# Patient Record
Sex: MALE | Race: WHITE | NOT HISPANIC OR LATINO | Employment: STUDENT | URBAN - METROPOLITAN AREA
[De-identification: names, ages, dates, MRNs, and addresses within clinical notes are randomized per-mention and may not be internally consistent; named-entity substitution may affect disease eponyms.]

---

## 2018-02-28 NOTE — MISCELLANEOUS
Message  Return to work or school:   Brittaney Nina is under my professional care  He was seen in my office on 02/15/2016     He is able to return to school on 02/17/2016    PLEASE EXCUSE 02/16  THANK YOU          Signatures   Electronically signed by : Dora Castanon, ; Feb 15 2016  2:02PM EST                       (Author)

## 2018-07-25 ENCOUNTER — OFFICE VISIT (OUTPATIENT)
Dept: PEDIATRICS CLINIC | Age: 18
End: 2018-07-25
Payer: COMMERCIAL

## 2018-07-25 VITALS — TEMPERATURE: 98.3 F | SYSTOLIC BLOOD PRESSURE: 110 MMHG | DIASTOLIC BLOOD PRESSURE: 78 MMHG | WEIGHT: 194 LBS
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
## 2018-07-25 PROCEDURE — 99213 OFFICE O/P EST LOW 20 MIN: CPT | Performed by: PEDIATRICS

## 2018-07-25 RX ORDER — PREDNISONE 20 MG/1
TABLET ORAL
Qty: 13 TABLET | Refills: 0 | Status: SHIPPED | OUTPATIENT
Start: 2018-07-25 | End: 2018-11-13

## 2018-07-25 RX ORDER — MOMETASONE FUROATE 1 MG/G
CREAM TOPICAL
Qty: 45 G | Refills: 0 | Status: SHIPPED | OUTPATIENT
Start: 2018-07-25 | End: 2018-11-13

## 2018-07-25 NOTE — PROGRESS NOTES
Assessment/Plan:    Will start oral steroid and topical antibiotic  If the area becomes tender then it might be infected so he needs to come back  Diagnoses and all orders for this visit:    Insect bite, initial encounter  Comments:  both wrist  Orders:  -     predniSONE 20 mg tablet; 30 mg 2x/day x 3 days then 20 mg 2x/day x 2 days  -     mometasone (ELOCON) 0 1 % cream; Apply to affected area daily        Subjective: insect bites     Patient ID: Sabas Arndt is a 19 y o  male  HPI  Has had this bite in his hands for 1 week it is still itchy and not going away  Has tried, benadryl, aloe and it is not helping /   The following portions of the patient's history were reviewed and updated as appropriate: allergies, current medications, past family history, past medical history, past social history, past surgical history and problem list     Review of Systems   Constitutional: Negative for activity change  HENT: Negative for congestion and sore throat  Respiratory: Negative for cough  Skin:        Skin lesion is the same no new insect bites         Objective:      /78 (BP Location: Left arm, Patient Position: Sitting, Cuff Size: Standard)   Temp 98 3 °F (36 8 °C) (Temporal)   Wt 88 kg (194 lb)          Physical Exam   Constitutional: No distress  HENT:   Nose: Nose normal    Mouth/Throat: Oropharynx is clear and moist  No oropharyngeal exudate  Cardiovascular:   No murmur heard  Pulmonary/Chest: Breath sounds normal    Musculoskeletal: Normal range of motion     Skin:   Some bumps in the right wrist ventral side itchy non tender, some bumps in the left wrist also itchy

## 2018-11-13 ENCOUNTER — OFFICE VISIT (OUTPATIENT)
Dept: PEDIATRICS CLINIC | Age: 18
End: 2018-11-13
Payer: COMMERCIAL

## 2018-11-13 VITALS — TEMPERATURE: 98.4 F | DIASTOLIC BLOOD PRESSURE: 80 MMHG | SYSTOLIC BLOOD PRESSURE: 128 MMHG | WEIGHT: 194 LBS

## 2018-11-13 DIAGNOSIS — R20.2 RIGHT HAND PARESTHESIA: ICD-10-CM

## 2018-11-13 DIAGNOSIS — R20.0 NUMBNESS AND TINGLING IN RIGHT HAND: Primary | ICD-10-CM

## 2018-11-13 DIAGNOSIS — R20.2 NUMBNESS AND TINGLING IN RIGHT HAND: Primary | ICD-10-CM

## 2018-11-13 PROCEDURE — 99213 OFFICE O/P EST LOW 20 MIN: CPT | Performed by: PEDIATRICS

## 2018-11-13 NOTE — PROGRESS NOTES
Assessment/Plan:   XR OF  CERVICAL  SPINE  ORDERED  ADVISED  OBSERVATION  AND  RE  EXAM  IN   2  WEEKS  (OR  SOONER  IF  WORSENS )  CONSIDER  NEUROLOGY  CONSULT      Diagnoses and all orders for this visit:    Numbness and tingling in right hand  -     XR spine cervical complete 4 or 5 vw non injury; Future    Right hand paresthesia          Subjective:     Patient ID: J Luis Finn is a 19 y o  male  FEELS  NUMBNESS  ON HIS   RIGHT  HAND  MOSTLY  AT  LITTLE  FINGER  UP  TO  HIS  NEARBY  PALM  AREA  AND  UP  TO  IS  WRIST , FOR  THE  PAST  4  DAYS   NO  H/O  TRAUMA REPORTED   HAD  FLU  SHOT  2  MONTHS  AGO   RECOVERED  FROM COLD/ COUGH   ILLNESS  2  WEEKS  AGO HAS  NO  LINGERING  COLD  SX , NO  FEVER        Review of Systems   Constitutional: Negative for activity change, appetite change and fever  HENT: Positive for congestion (RESOLVED) and rhinorrhea (RESOLVED)  Respiratory: Positive for cough (RESOLVED)  Neurological: Positive for weakness (REPORTS  SOME  LOSS  OF  STRENGHT  AT  AFFECTED  AREA) and numbness (AT  RIGHT  LTLLE  FINGER  AREA UP  TO  THE   WRIST)  Negative for dizziness and headaches  FEELING  OF  PIN /NEEDLES  AT  AFFECTED  AREA          Objective:     Physical Exam   Constitutional: He appears well-developed and well-nourished  No distress  HENT:   Right Ear: External ear normal    Left Ear: External ear normal    Nose: Nose normal    Mouth/Throat: Oropharynx is clear and moist  No oropharyngeal exudate  Eyes: Conjunctivae and EOM are normal    Neck: Neck supple  No tracheal deviation present  No thyromegaly present  Cardiovascular: Normal rate, regular rhythm and normal heart sounds  No murmur heard  Pulmonary/Chest: Effort normal and breath sounds normal    Abdominal: He exhibits no mass  There is no tenderness  Musculoskeletal: Normal range of motion     NECK  EXAMINATION UNREMARKABLE   AXILLARY  AREA  A ELBOW  WRIST  AND  FINGER  EXAM  UNREMARKABLE  EXCEPT  FOR REPORTED  DISTAL  NUMBNESS  ON  5TH  FINGER    Lymphadenopathy:     He has no cervical adenopathy  Neurological: He is alert  REPORTS  SENSATION CHANGES ON  RIGHT LITTLE   FINGER  UP  TO   LATERALL  WRIST  AREA ,  AREA  OF  NUMBNESS  FOLLOWS  DERMATOME  C-8 NERVE  DISTRIBUTION   ABLE  TO   DISCRIMINATE  SHARP  FROM DULL PRICK  PIN TEST  ON  EXAM  ON  THE  AFFECTED  AREA WITH  EYES  CLOSED , NO GROSS  WEAKNESS  OF   FINGER / ELBOW / ARM  AS  COMPARES  TO  OPPOSITE SIDE    Skin: Skin is warm  No rash (NO RASH) noted  Psychiatric: He has a normal mood and affect  Vitals reviewed

## 2019-03-14 ENCOUNTER — OFFICE VISIT (OUTPATIENT)
Dept: PEDIATRICS CLINIC | Age: 19
End: 2019-03-14
Payer: COMMERCIAL

## 2019-03-14 VITALS
WEIGHT: 195 LBS | SYSTOLIC BLOOD PRESSURE: 120 MMHG | RESPIRATION RATE: 20 BRPM | TEMPERATURE: 98.5 F | DIASTOLIC BLOOD PRESSURE: 80 MMHG

## 2019-03-14 DIAGNOSIS — J03.80 ACUTE BACTERIAL TONSILLITIS: Primary | ICD-10-CM

## 2019-03-14 DIAGNOSIS — L73.1 INGROWN HAIR: ICD-10-CM

## 2019-03-14 DIAGNOSIS — B96.89 ACUTE BACTERIAL TONSILLITIS: Primary | ICD-10-CM

## 2019-03-14 PROCEDURE — 99213 OFFICE O/P EST LOW 20 MIN: CPT | Performed by: PEDIATRICS

## 2019-03-14 RX ORDER — AMOXICILLIN AND CLAVULANATE POTASSIUM 875; 125 MG/1; MG/1
1 TABLET, FILM COATED ORAL 2 TIMES DAILY
Qty: 20 TABLET | Refills: 0 | Status: SHIPPED | OUTPATIENT
Start: 2019-03-14 | End: 2019-03-24

## 2019-03-14 NOTE — PROGRESS NOTES
Assessment/Plan:  I instructed him to use a warm compress on the scrotal lesion  It appears like an ingrown hair  I will treat him with Augmentin for the tonsillitis  Follow up prn  Diagnoses and all orders for this visit:    Acute bacterial tonsillitis  -     amoxicillin-clavulanate (AUGMENTIN) 875-125 mg per tablet; Take 1 tablet by mouth 2 (two) times a day for 10 days    Ingrown hair          Subjective:      Patient ID: Curtis De León is a 19 y o  male  He feels as though is tonsils are swollen x 2 weeks  He has some dysphagia  He has a change in voice  No fever  Some rhinorrhea but not post nasal drip  He is snoring now  No chills, vomiting, or headache  No fatigue or body ache  He is has the same appetite  He is drinking well  He as a lump on the scrotum x 2 weeks  No pain or discharge  He has been trimming the scrotum  No dysuria  No penial discharge  He is sexually active  He has one female partner  He uses condomes sometimes  No testicular pain  No urinary frequency  The following portions of the patient's history were reviewed and updated as appropriate:   He  has no past medical history on file  He There are no active problems to display for this patient  He  has a past surgical history that includes Circumcision  His family history includes No Known Problems in his father and mother  He  reports that he has never smoked  He has never used smokeless tobacco  His alcohol and drug histories are not on file  Current Outpatient Medications   Medication Sig Dispense Refill    amoxicillin-clavulanate (AUGMENTIN) 875-125 mg per tablet Take 1 tablet by mouth 2 (two) times a day for 10 days 20 tablet 0     No current facility-administered medications for this visit  No current outpatient medications on file prior to visit  No current facility-administered medications on file prior to visit  He has No Known Allergies       Review of Systems Constitutional: Negative for fever  HENT: Positive for congestion and trouble swallowing  Negative for rhinorrhea  Snoring   Eyes: Negative for discharge, redness and itching  Respiratory: Negative for cough and shortness of breath  Gastrointestinal: Negative for constipation, diarrhea and vomiting  Genitourinary: Positive for genital sores (on the left side of the undersurface of the scrotum there is a subcutaneous mass  )  Negative for decreased urine volume, difficulty urinating, discharge, dysuria, frequency, hematuria, penile pain, penile swelling, scrotal swelling, testicular pain and urgency  Skin: Negative for rash  Neurological: Negative for headaches  Psychiatric/Behavioral: Negative for sleep disturbance  Objective:      /80   Temp 98 5 °F (36 9 °C)   Resp 20   Wt 88 5 kg (195 lb)          Physical Exam   Constitutional: He appears well-developed and well-nourished  No distress  HENT:   Head: Normocephalic  Right Ear: External ear normal    Left Ear: External ear normal    Nose: No mucosal edema  Mouth/Throat: Mucous membranes are normal  No oropharyngeal exudate, posterior oropharyngeal edema, posterior oropharyngeal erythema or tonsillar abscesses  Tonsils are 3+ on the right  Tonsils are 3+ on the left  No tonsillar exudate  Nasal congestion and erythema of the nasal passages  Eyes: Pupils are equal, round, and reactive to light  Conjunctivae are normal  Right eye exhibits no discharge  Left eye exhibits no discharge  Neck: Normal range of motion  Neck supple  Cardiovascular: Normal rate, regular rhythm and normal heart sounds  No murmur heard  Pulmonary/Chest: Effort normal and breath sounds normal  No respiratory distress  He has no wheezes  He has no rales  Abdominal: Soft  Bowel sounds are normal  He exhibits no distension and no mass  There is no tenderness  There is no guarding     Genitourinary: Penis normal          Lymphadenopathy: He has no cervical adenopathy  Neurological: He is alert  Skin: Skin is warm

## 2019-03-21 ENCOUNTER — OFFICE VISIT (OUTPATIENT)
Dept: PEDIATRICS CLINIC | Age: 19
End: 2019-03-21
Payer: COMMERCIAL

## 2019-03-21 VITALS — SYSTOLIC BLOOD PRESSURE: 116 MMHG | TEMPERATURE: 98.5 F | WEIGHT: 195 LBS | DIASTOLIC BLOOD PRESSURE: 78 MMHG

## 2019-03-21 DIAGNOSIS — B96.89 ACUTE BACTERIAL TONSILLITIS: ICD-10-CM

## 2019-03-21 DIAGNOSIS — J03.80 ACUTE BACTERIAL TONSILLITIS: ICD-10-CM

## 2019-03-21 DIAGNOSIS — Z20.2 EXPOSURE TO GENITAL HERPES: Primary | ICD-10-CM

## 2019-03-21 DIAGNOSIS — L73.1 INGROWN HAIR: ICD-10-CM

## 2019-03-21 PROCEDURE — 99214 OFFICE O/P EST MOD 30 MIN: CPT | Performed by: PEDIATRICS

## 2019-03-21 NOTE — PROGRESS NOTES
Assessment/Plan:  I ordered a HSV PCR of the genital lesion  I did discuss that the lab may not be covered by insurance but he still wanted me to proceed with the test   I also ordered blood HSV 1/2 antibodies  He will follow up prn  The mass seen at the last visit looked much smaller today  He also appears better from the tonsillitis he presented with last visit  Diagnoses and all orders for this visit:    Exposure to genital herpes  -     Herpes I/II IgG JORGE w Reflex to HSV-2; Future  -     Herpes I/II IgG JORGE w Reflex to HSV-2    Acute bacterial tonsillitis    Ingrown hair          Subjective:      Patient ID: Berenice Kumar is a 19 y o  male  Anam Treadwell is here today for a different lesion that he found on the dorsum of the penis  The lesion has been present for about a week  No pain or discharge present  He did pick at the lesion  Anam Treadwell is sexually active with a female partner  She currently has genital herpes  He did have unprotected sex with his girl friend  He has no dysuria or frequency  No fever or chills  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  The following portions of the patient's history were reviewed and updated as appropriate:   He  has no past medical history on file  He   Patient Active Problem List    Diagnosis Date Noted    Ingrown hair 03/21/2019    Scoliosis 08/08/2013     He  has a past surgical history that includes Circumcision  His family history includes No Known Problems in his father and mother  He  reports that he has never smoked  He has never used smokeless tobacco  His alcohol and drug histories are not on file  Current Outpatient Medications   Medication Sig Dispense Refill    amoxicillin-clavulanate (AUGMENTIN) 875-125 mg per tablet Take 1 tablet by mouth 2 (two) times a day for 10 days 20 tablet 0     No current facility-administered medications for this visit        Current Outpatient Medications on File Prior to Visit   Medication Sig  amoxicillin-clavulanate (AUGMENTIN) 875-125 mg per tablet Take 1 tablet by mouth 2 (two) times a day for 10 days     No current facility-administered medications on file prior to visit  He has No Known Allergies       Review of Systems   Constitutional: Negative for fever  HENT: Negative for congestion and rhinorrhea  Eyes: Negative for discharge, redness and itching  Respiratory: Negative for cough and shortness of breath  Gastrointestinal: Negative for constipation, diarrhea and vomiting  Genitourinary: Positive for genital sores  Negative for decreased urine volume, difficulty urinating, discharge, dysuria, enuresis, frequency, penile pain, penile swelling, scrotal swelling, testicular pain and urgency  Skin: Negative for rash  Neurological: Negative for headaches  Psychiatric/Behavioral: Negative for sleep disturbance  Objective:      /78   Temp 98 5 °F (36 9 °C)   Wt 88 5 kg (195 lb)          Physical Exam   Constitutional: He appears well-developed and well-nourished  No distress  HENT:   Head: Normocephalic  Right Ear: External ear normal    Left Ear: External ear normal    Nose: Nose normal    Mouth/Throat: No oropharyngeal exudate  Eyes: Pupils are equal, round, and reactive to light  Conjunctivae are normal  Right eye exhibits no discharge  Left eye exhibits no discharge  Neck: Normal range of motion  Neck supple  Cardiovascular: Normal rate, regular rhythm and normal heart sounds  No murmur heard  Pulmonary/Chest: Effort normal and breath sounds normal  No respiratory distress  He has no wheezes  He has no rales  Abdominal: Soft  Bowel sounds are normal  He exhibits no distension and no mass  There is no tenderness  There is no guarding  Genitourinary: Testes normal  Circumcised  Lymphadenopathy:     He has no cervical adenopathy  No inguinal adenopathy noted on the right or left side  Neurological: He is alert  Skin: Skin is warm

## 2019-03-23 ENCOUNTER — TELEPHONE (OUTPATIENT)
Dept: PEDIATRICS CLINIC | Age: 19
End: 2019-03-23

## 2019-03-23 NOTE — TELEPHONE ENCOUNTER
I called Humberto Kumar to inform him that he tested positive for HSV 1 IGG but was negative for HSV 2  The lesion on the penis was negative for HSV 1 and 2  No other questions were asked  He verbally understood

## 2019-06-26 ENCOUNTER — OFFICE VISIT (OUTPATIENT)
Dept: URGENT CARE | Facility: CLINIC | Age: 19
End: 2019-06-26
Payer: COMMERCIAL

## 2019-06-26 VITALS
SYSTOLIC BLOOD PRESSURE: 132 MMHG | TEMPERATURE: 97.9 F | WEIGHT: 192.6 LBS | HEART RATE: 92 BPM | OXYGEN SATURATION: 97 % | HEIGHT: 72 IN | DIASTOLIC BLOOD PRESSURE: 62 MMHG | RESPIRATION RATE: 16 BRPM | BODY MASS INDEX: 26.09 KG/M2

## 2019-06-26 DIAGNOSIS — L03.311 CELLULITIS OF ABDOMINAL WALL: Primary | ICD-10-CM

## 2019-06-26 PROCEDURE — 99213 OFFICE O/P EST LOW 20 MIN: CPT | Performed by: PHYSICIAN ASSISTANT

## 2019-06-26 RX ORDER — CEPHALEXIN 500 MG/1
500 CAPSULE ORAL EVERY 8 HOURS SCHEDULED
Qty: 21 CAPSULE | Refills: 0 | Status: SHIPPED | OUTPATIENT
Start: 2019-06-26 | End: 2019-07-03

## 2020-01-18 ENCOUNTER — APPOINTMENT (OUTPATIENT)
Dept: RADIOLOGY | Facility: CLINIC | Age: 20
End: 2020-01-18
Payer: COMMERCIAL

## 2020-01-18 ENCOUNTER — OFFICE VISIT (OUTPATIENT)
Dept: URGENT CARE | Facility: CLINIC | Age: 20
End: 2020-01-18
Payer: COMMERCIAL

## 2020-01-18 VITALS
HEIGHT: 72 IN | HEART RATE: 73 BPM | RESPIRATION RATE: 16 BRPM | WEIGHT: 203 LBS | BODY MASS INDEX: 27.5 KG/M2 | TEMPERATURE: 97.5 F | SYSTOLIC BLOOD PRESSURE: 121 MMHG | OXYGEN SATURATION: 99 % | DIASTOLIC BLOOD PRESSURE: 69 MMHG

## 2020-01-18 DIAGNOSIS — M25.512 ACUTE PAIN OF LEFT SHOULDER: ICD-10-CM

## 2020-01-18 DIAGNOSIS — M25.512 ACUTE PAIN OF LEFT SHOULDER: Primary | ICD-10-CM

## 2020-01-18 PROCEDURE — 99213 OFFICE O/P EST LOW 20 MIN: CPT | Performed by: PHYSICIAN ASSISTANT

## 2020-01-18 PROCEDURE — 73030 X-RAY EXAM OF SHOULDER: CPT

## 2020-01-18 NOTE — PROGRESS NOTES
[facility]        NAME: Edmond Mccrary is a 22 y o  male  : 2000    MRN: [id number]  DATE: 2020  TIME: 12:10 PM    Assessment and Plan   Acute pain of left shoulder [M25 512]  1  Acute pain of left shoulder  XR shoulder 2+ vw left    CANCELED: XR shoulder 2+ vw left         Patient Instructions     Discussed condition with patient  X-ray of left shoulder is negative  I suspect an acute strain versus contusion of the left shoulder joint for which I recommended rest, ice, NSAIDs, and observation  He should avoid any heavy lifting or strenuous activity with the left upper extremity  If no significant improvement over the next 1-2 weeks, then I would recommend he be further evaluated  Follow up with PCP in 3-5 days  Proceed to  ER if symptoms worsen  Chief Complaint     Chief Complaint   Patient presents with    Shoulder Injury     Pt reports of shoulder pain from a fall while snowboarding  Incident occurred 5 days ago  History of Present Illness       Patient presents with what he reports are multiple injuries to the left shoulder which have occurred over the past 5 days  He reports that he sustained a few hard falls snowboarding and since then he is having pain in the left shoulder joint worse with movement  He reports some popping but denies any history of dislocation  He denies radiation of the pain down the left upper extremity or any numbness, weakness, paresthesias  He has been treating conservatively since the pain started  Review of Systems   Review of Systems   Constitutional: Negative  Respiratory: Negative  Cardiovascular: Negative  Gastrointestinal: Negative  Genitourinary: Negative  Musculoskeletal:        Left shoulder pain status post fall   Neurological: Negative  Current Medications     No current outpatient medications on file      Current Allergies     Allergies as of 2020    (No Known Allergies)            The following portions of the patient's history were reviewed and updated as appropriate: allergies, current medications, past family history, past medical history, past social history, past surgical history and problem list      History reviewed  No pertinent past medical history  Past Surgical History:   Procedure Laterality Date    CIRCUMCISION         Family History   Problem Relation Age of Onset    No Known Problems Mother     No Known Problems Father          Medications have been verified  Objective   /69   Pulse 73   Temp 97 5 °F (36 4 °C)   Resp 16   Ht 6' (1 829 m)   Wt 92 1 kg (203 lb)   SpO2 99%   BMI 27 53 kg/m²        Physical Exam     Physical Exam   Constitutional: He is oriented to person, place, and time  He appears well-developed and well-nourished  No distress  Musculoskeletal:   Tenderness to palpation over the left shoulder joint worsened with passive range of motion which is intact with some difficulty and there is crepitus noted with movement but no sign of instability noted  There is no surrounding muscle spasm noted  Upper extremity  strength and push/pull 5/5 bilaterally  Neurological: He is alert and oriented to person, place, and time  Psychiatric: He has a normal mood and affect  Vitals reviewed

## 2020-01-18 NOTE — PATIENT INSTRUCTIONS
Shoulder Sprain   WHAT YOU NEED TO KNOW:   A shoulder sprain happens when a ligament in your shoulder is stretched or torn  Ligaments are the tough tissues that connect bones  Ligaments allow you to lift, lower, and rotate your arm  DISCHARGE INSTRUCTIONS:   Return to the emergency department if:   · You are short of breath  · Your throat feels tight, or you have trouble swallowing  · You feel sudden, sharp chest pain on the same side as your injury  · Your skin feels cold or clammy  Contact your healthcare provider if:   · The skin on your injured shoulder looks blue or pale  · You have new or increased swelling and pain in your shoulder  · You have new or increased stiffness when you move your injured shoulder  · You have questions or concerns about your condition or care  Medicines:   · Prescription pain medicine  may be given  Ask how to take this medicine safely  · Take your medicine as directed  Contact your healthcare provider if you think your medicine is not helping or if you have side effects  Tell him or her if you are allergic to any medicine  Keep a list of the medicines, vitamins, and herbs you take  Include the amounts, and when and why you take them  Bring the list or the pill bottles to follow-up visits  Carry your medicine list with you in case of an emergency  Follow up with your healthcare provider as directed:  Write down your questions so you remember to ask them during your visits  Self-care:   · Rest  your shoulder so it can heal  Avoid moving your shoulder as your injury heals  This will help decrease the risk of more damage to your shoulder  · Apply ice  on your shoulder for 15 to 20 minutes every hour or as directed  Use an ice pack, or put crushed ice in a plastic bag  Cover it with a towel  Ice helps prevent tissue damage and decreases swelling and pain  · Compress your shoulder as directed   Compression provides support and helps decrease swelling and movement so your shoulder can heal  For mild sprains, you may be given a sling to support your arm  You may need a padded brace or a plaster cast to hold your shoulder in place if the sprain is more serious  How to wear a brace, sling, or splint:  A brace, sling, or splint may be needed to limit your movement and protect your injured shoulder  · Wear your brace, sling, or splint as directed  You may need to wear it all the time and take it off only to bathe or do exercises  Ask your healthcare provider how long you should wear it  · Keep your skin clean and dry  Padding under your armpit will help absorb sweat and prevent sores on your skin  · Do not hunch your shoulders  This may cause pain  Keep your shoulders relaxed  · Position the sling over your arm and hand so that it also covers your knuckles  This will help the sling support your wrist and hand  Position your wrist higher than your elbow  Your wrist may start to hurt or go numb if your sling is too short  Exercise your shoulder:  After you rest your shoulder for 3 to 7 days, you will need to do light exercises to decrease shoulder stiffness  Check with your healthcare provider before you return to your normal activities or sports  Prevent another injury:  You can hurt your shoulder again if you stop treatment too soon  The following may decrease your risk for sprains:  · Do not exercise when you are tired or in pain  Warm up and stretch before you exercise  · Wear equipment to protect yourself when you play sports  · Wear shoes that fit well and run on flat surfaces to prevent falls  © 2017 [employer] Information is for End User's use only and may not be sold, redistributed or otherwise used for commercial purposes  All illustrations and images included in CareNotes® are the copyrighted property of [employer]  or [employer]  The above information is an  only   It is not intended as medical advice for individual conditions or treatments  Talk to your doctor, nurse or pharmacist before following any medical regimen to see if it is safe and effective for you

## 2020-02-24 VITALS
HEIGHT: 72 IN | DIASTOLIC BLOOD PRESSURE: 77 MMHG | HEART RATE: 65 BPM | SYSTOLIC BLOOD PRESSURE: 144 MMHG | BODY MASS INDEX: 27.85 KG/M2 | WEIGHT: 205.6 LBS

## 2020-02-24 DIAGNOSIS — S46.812A STRAIN OF LEFT TRAPEZIUS MUSCLE, INITIAL ENCOUNTER: Primary | ICD-10-CM

## 2020-02-24 PROCEDURE — 3008F BODY MASS INDEX DOCD: CPT | Performed by: ORTHOPAEDIC SURGERY

## 2020-02-24 PROCEDURE — 1036F TOBACCO NON-USER: CPT | Performed by: ORTHOPAEDIC SURGERY

## 2020-02-24 PROCEDURE — 99203 OFFICE O/P NEW LOW 30 MIN: CPT | Performed by: ORTHOPAEDIC SURGERY

## 2020-02-24 NOTE — PATIENT INSTRUCTIONS
Early Postoperative or Post Injury Shoulder Exercises   WHAT YOU NEED TO KNOW:   You may need to wait until your swelling and pain have gone down before you start to exercise  Do not start an exercise program before you talk to your healthcare provider  DISCHARGE INSTRUCTIONS:   Contact your healthcare provider if:   · You have sharp or worsening pain during exercise or at rest     · You have questions or concerns about your shoulder exercises  Before you exercise:  Warm up and stretch before you exercise  Walk or ride a stationary bike for 5 to 10 minutes to help you warm up  Stretching helps increase range of motion  It may also decrease muscle soreness and help prevent another injury  Your healthcare provider will tell you which of the following stretches to do:  · Crossover arm stretch:  Relax your shoulders  Hold your upper arm with the opposite hand  Pull your arm across your chest until you feel a stretch  Hold the stretch for 30 seconds  Return to the starting position  · Shoulder flexion stretch:  Stand facing a wall  Slowly walk your fingers up the wall until you feel a stretch  Hold the stretch for 30 seconds  Return to the starting position  · Sleeper stretch:  Lie on your injured side on a firm, flat surface  Bend the elbow of your injured arm 90° with your hand facing up  Use your arm that is not injured to slowly push your injured arm down  Stop when you feel a stretch at the back of your injured shoulder  Hold the stretch for 30 seconds  Slowly return to the starting position  How to perform exercises without a weight or an exercise band:   · Pendulum swings:  Lean forward and rest the arm that is not injured on a table  Do not round your back or lock your knees during the exercise  Let your other arm hang freely by your side  Gently swing your injured arm forward and backward, side to side, and in circles  · Shrugs:  Stand with your arms by your side   Gently lift your shoulders up to your ears and hold for 5 seconds  With your shoulders lifted, pinch your shoulder blades together  Hold for 5 seconds  Slowly return to the starting position  How to exercise with a weight:  Hold a weight with your arm slightly in front of your body  Slowly raise your arm to the side with your thumb pointing up or down as directed  Stop when you reach the level of your shoulder  Hold for as many seconds as directed  Slowly return to the starting position  How to exercise with an exercise band:   · Hold the exercise band with both hands in front of your body  Slowly raise your injured arm up and to the side with your thumb pointing up or down as directed  Stop when you reach the level of your shoulder  Hold for as many seconds as directed  Slowly return to the starting position  · Tie one end of the exercise band to a heavy object  Stand and hold the band in your hand  Bend your elbow  Keep your arm close to your side and pull the band straight back  Squeeze your shoulder blades together as you pull  Slowly return to the starting position  Follow up with your physical therapist as directed:  Write down your questions so you remember to ask them at your visits  © 2017 [facility] Information is for End User's use only and may not be sold, redistributed or otherwise used for commercial purposes  All illustrations and images included in CareNotes® are the copyrighted property of [employer]  or [employer]  The above information is an  only  It is not intended as medical advice for individual conditions or treatments  Talk to your doctor, nurse or pharmacist before following any medical regimen to see if it is safe and effective for you

## 2020-02-24 NOTE — PROGRESS NOTES
Assessment/Plan:  1  Strain of left trapezius muscle, initial encounter  Ambulatory referral to Physical Therapy       Scribe Attestation    I,:   Jocelyn Rogers am acting as a scribe while in the presence of the attending physician :        I,:   Alberto Kaiser MD personally performed the services described in this documentation    as scribed in my presence :              Nicolle Adhikari has excellent range of motion and strength about the shoulder  The only limitation I could find is a slight limitation in left lateral flexion of the cervical spine that did produce a tightness sensation  There is increased tissue tension in the left trapezius region as well  I cannot necessarily say there is  tenderness but more so a mild restriction  The rotator cuff has excellent strength and he is negative for labral injury or glenohumeral instability   The acromioclavicular joint is nontender and normal in appearance  The sternoclavicular joint also is nontender and normal in appearance  I feel he will do well with a physician directed exercise program at home  I did explain that this may take some time to return to normalcy  My  did show him additional cervical spine as well as left trapezius flexibility exercises  If he is not feeling well in the next month he should return to see me  Subjective:   Allyson Aguirre is a 22 y o  male who presents to the office today for evaluation of his left shoulder  On January 14th he was snowboarding and fell repeatedly landing on the left shoulder  He he did not experience pain at the time but later that evening he does drives a persistent ache located in the left trapezius region that was nonradiating  At the time the pain is considered mild to moderate  He states that has improved over time  He still complains of a mild soreness with exercise such as pull-ups  Turning a steering wheel will also exacerbate his symptoms    Reaching across his body to scratch is back also will produce mild discomfort  Currently has no pain at rest and denies distal paresthesias  Review of Systems   Constitutional: Negative for chills, fever and unexpected weight change  HENT: Negative for hearing loss, nosebleeds and sore throat  Eyes: Negative for pain, redness and visual disturbance  Respiratory: Negative for cough, shortness of breath and wheezing  Cardiovascular: Negative for chest pain, palpitations and leg swelling  Gastrointestinal: Negative for abdominal pain, nausea and vomiting  Endocrine: Negative for polyphagia and polyuria  Genitourinary: Negative for dysuria and hematuria  Musculoskeletal:        See HPI   Skin: Negative for rash and wound  Neurological: Negative for dizziness, numbness and headaches  Psychiatric/Behavioral: Negative for decreased concentration and suicidal ideas  The patient is not nervous/anxious  History reviewed  No pertinent past medical history  Past Surgical History:   Procedure Laterality Date    CIRCUMCISION         Family History   Problem Relation Age of Onset    No Known Problems Mother     No Known Problems Father     No Known Problems Sister     No Known Problems Brother     No Known Problems Maternal Aunt     No Known Problems Maternal Uncle     No Known Problems Paternal Aunt     No Known Problems Paternal Uncle     No Known Problems Maternal Grandmother     No Known Problems Maternal Grandfather     No Known Problems Paternal Grandmother     No Known Problems Paternal Grandfather        Social History     Occupational History    Not on file   Tobacco Use    Smoking status: Never Smoker    Smokeless tobacco: Never Used   Substance and Sexual Activity    Alcohol use: Yes     Frequency: Monthly or less    Drug use: Never    Sexual activity: Yes     Partners: Female     Birth control/protection: Condom Male       No current outpatient medications on file      No Known Allergies    Objective:  Vitals:    02/24/20 0821   BP: 144/77   Pulse: 65       Left Shoulder Exam     Tenderness   The patient is experiencing no tenderness  Range of Motion   Active abduction: 170   External rotation: 90 (90° OF ABDUCTION)   Forward flexion: 180   Internal rotation 0 degrees: Mid thoracic   Internal rotation 90 degrees: 80     Muscle Strength   Abduction: 5/5   Internal rotation: 5/5   External rotation: 5/5   Supraspinatus: 5/5   Subscapularis: 5/5   Biceps: 5/5     Tests   Apprehension: negative  Saldaña test: negative  Cross arm: negative  Impingement: negative  Sulcus: absent    Other   Sensation: normal  Pulse: present (2+ RADIAL PULSE)     Comments:  PALPABLE TISSUE TENSION IN TRAPEZIUS 1 AND 2 THAT IS NON-TENDER    NORMAL CERVICAL ROTATION AND FLEXION AND EXTENSION  RIGHT LATERAL FLEXION IS NORMAL  LEFT LATERAL FLEXION IS SLIGHTLY LIMITED IN COMPARISON TO THE RIGHT  Physical Exam   Constitutional: He is oriented to person, place, and time  He appears well-developed and well-nourished  HENT:   Head: Normocephalic and atraumatic  Eyes: Conjunctivae are normal  Right eye exhibits no discharge  Left eye exhibits no discharge  Neck: Normal range of motion  Neck supple  Cardiovascular: Regular rhythm and intact distal pulses  Pulmonary/Chest: Effort normal  No respiratory distress  Neurological: He is alert and oriented to person, place, and time  Skin: Skin is warm and dry  Psychiatric: He has a normal mood and affect  His behavior is normal    Vitals reviewed  I have personally reviewed pertinent films in PACS and my interpretation is as follows:  X-RAYS OF THE LEFT SHOULDER NORMAL  THERE IS NO EVIDENCE OF ACUTE FRACTURE OR OTHER OSSEOUS ABNORMALITY

## 2020-10-04 ENCOUNTER — OFFICE VISIT (OUTPATIENT)
Dept: URGENT CARE | Facility: CLINIC | Age: 20
End: 2020-10-04
Payer: COMMERCIAL

## 2020-10-04 VITALS
HEART RATE: 62 BPM | OXYGEN SATURATION: 100 % | SYSTOLIC BLOOD PRESSURE: 120 MMHG | WEIGHT: 211 LBS | HEIGHT: 72 IN | DIASTOLIC BLOOD PRESSURE: 70 MMHG | BODY MASS INDEX: 28.58 KG/M2 | RESPIRATION RATE: 18 BRPM | TEMPERATURE: 97 F

## 2020-10-04 DIAGNOSIS — J35.8 TONSIL STONE: ICD-10-CM

## 2020-10-04 DIAGNOSIS — J35.1 TONSILLAR ENLARGEMENT: Primary | ICD-10-CM

## 2020-10-04 PROBLEM — R59.9 SWOLLEN GLAND: Status: ACTIVE | Noted: 2020-10-04

## 2020-10-04 LAB — S PYO AG THROAT QL: NEGATIVE

## 2020-10-04 PROCEDURE — 99213 OFFICE O/P EST LOW 20 MIN: CPT | Performed by: FAMILY MEDICINE

## 2020-10-04 PROCEDURE — 87880 STREP A ASSAY W/OPTIC: CPT | Performed by: FAMILY MEDICINE

## 2020-10-04 RX ORDER — CETIRIZINE HYDROCHLORIDE 10 MG/1
10 TABLET ORAL DAILY PRN
COMMUNITY

## 2020-10-04 RX ORDER — METHYLPREDNISOLONE 4 MG/1
TABLET ORAL
Qty: 21 TABLET | Refills: 0 | Status: SHIPPED | OUTPATIENT
Start: 2020-10-04 | End: 2020-10-21 | Stop reason: ALTCHOICE

## 2020-10-04 RX ORDER — PENICILLIN V POTASSIUM 500 MG/1
500 TABLET ORAL EVERY 12 HOURS
Qty: 20 TABLET | Refills: 0 | Status: SHIPPED | OUTPATIENT
Start: 2020-10-04 | End: 2020-10-14

## 2020-10-21 ENCOUNTER — OFFICE VISIT (OUTPATIENT)
Dept: FAMILY MEDICINE CLINIC | Facility: CLINIC | Age: 20
End: 2020-10-21
Payer: COMMERCIAL

## 2020-10-21 VITALS
WEIGHT: 207 LBS | BODY MASS INDEX: 28.04 KG/M2 | DIASTOLIC BLOOD PRESSURE: 62 MMHG | HEART RATE: 56 BPM | RESPIRATION RATE: 16 BRPM | SYSTOLIC BLOOD PRESSURE: 110 MMHG | HEIGHT: 72 IN | TEMPERATURE: 95.7 F

## 2020-10-21 DIAGNOSIS — Z00.00 WELL ADULT EXAM: Primary | ICD-10-CM

## 2020-10-21 DIAGNOSIS — Z11.1 TUBERCULOSIS SCREENING: ICD-10-CM

## 2020-10-21 DIAGNOSIS — Z23 NEED FOR VACCINATION: ICD-10-CM

## 2020-10-21 PROCEDURE — 99385 PREV VISIT NEW AGE 18-39: CPT | Performed by: FAMILY MEDICINE

## 2020-10-21 PROCEDURE — 3725F SCREEN DEPRESSION PERFORMED: CPT | Performed by: FAMILY MEDICINE

## 2020-10-21 PROCEDURE — 90471 IMMUNIZATION ADMIN: CPT

## 2020-10-21 PROCEDURE — 1036F TOBACCO NON-USER: CPT | Performed by: FAMILY MEDICINE

## 2020-10-21 PROCEDURE — 90715 TDAP VACCINE 7 YRS/> IM: CPT

## 2020-10-21 PROCEDURE — 86580 TB INTRADERMAL TEST: CPT

## 2020-10-21 PROCEDURE — 90472 IMMUNIZATION ADMIN EACH ADD: CPT

## 2020-10-21 PROCEDURE — 90686 IIV4 VACC NO PRSV 0.5 ML IM: CPT

## 2020-10-23 ENCOUNTER — CLINICAL SUPPORT (OUTPATIENT)
Dept: FAMILY MEDICINE CLINIC | Facility: CLINIC | Age: 20
End: 2020-10-23

## 2020-10-23 VITALS — TEMPERATURE: 96.9 F

## 2020-10-23 DIAGNOSIS — Z11.1 ENCOUNTER FOR PPD SKIN TEST READING: Primary | ICD-10-CM

## 2020-10-23 LAB
INDURATION: 0 MM
TB SKIN TEST: NEGATIVE

## 2021-09-24 ENCOUNTER — APPOINTMENT (OUTPATIENT)
Dept: RADIOLOGY | Facility: CLINIC | Age: 21
End: 2021-09-24
Payer: COMMERCIAL

## 2021-09-24 ENCOUNTER — OFFICE VISIT (OUTPATIENT)
Dept: OBGYN CLINIC | Facility: CLINIC | Age: 21
End: 2021-09-24
Payer: COMMERCIAL

## 2021-09-24 DIAGNOSIS — S46.811A STRAIN OF RIGHT TRAPEZIUS MUSCLE, INITIAL ENCOUNTER: Primary | ICD-10-CM

## 2021-09-24 DIAGNOSIS — M25.511 RIGHT SHOULDER PAIN, UNSPECIFIED CHRONICITY: ICD-10-CM

## 2021-09-24 PROCEDURE — 99214 OFFICE O/P EST MOD 30 MIN: CPT | Performed by: ORTHOPAEDIC SURGERY

## 2021-09-24 PROCEDURE — 73030 X-RAY EXAM OF SHOULDER: CPT

## 2021-09-24 NOTE — PATIENT INSTRUCTIONS
Exercises for Shoulder Abduction and Adduction   AMBULATORY CARE:   Shoulder abduction and adduction exercises  work the muscles at the back of your shoulder and your upper back  Before you exercise:  Warm up and stretch before you exercise  Walk or ride a stationary bike for 5 to 10 minutes to help you warm up  Stretching helps increase range of motion  It may also decrease muscle soreness and help prevent another injury  Your healthcare provider will tell you which of the following stretches to do:  · Crossover arm stretch:  Relax your shoulders  Hold your upper arm with the opposite hand  Pull your arm across your chest until you feel a stretch  Hold the stretch for 30 seconds  Return to the starting position  · Shoulder flexion stretch:  Stand facing a wall  Slowly walk your fingers up the wall until you feel a stretch  Hold the stretch for 30 seconds  Return to the starting position  · Sleeper stretch:  Lie on your injured side on a firm, flat surface  Bend the elbow of your injured arm 90° with your hand facing up  Use your arm that is not injured to slowly push your injured arm down  Stop when you feel a stretch at the back of your injured shoulder  Hold the stretch for 30 seconds  Slowly return to the starting position  Contact your healthcare provider if:   · You have sharp or worsening pain during exercise or at rest     · You have questions or concerns about your shoulder exercises  How to exercise with a weight:  Your healthcare provider will tell you how much weight to use  · Shoulder abduction:  Stand and hold a weight in your hand with your palm facing your body  Slowly raise your arm to the side with your thumb pointing up  Then raise your arm over your head as far as you can without pain  Hold this position for as long as directed  Do not raise your arm over your head unless your healthcare provider says it is okay            · Shoulder adduction:  Lie on your back on a firm surface  Extend your arm out to a "T " Bend your elbow so your forearm in the air  Hold a weight in your hand  Slowly raise your arm toward the ceiling and straighten your elbow  Hold this position for as long as directed  Slowly return to the starting position  How to exercise with an exercise band:   · Shoulder abduction:  Wrap the exercise band around a heavy, stable object near your foot  Grab the band with the hand of your injured shoulder  Keep your arm straight  Slowly raise your arm to the side with your thumb pointing up  Then, slowly pull the band over your head as far as you can without pain  Do not raise your arm over your head unless your healthcare provider says it is okay  Do not let your shoulder shrug  Hold this position for as long as directed  Slowly return to the starting position  · Shoulder adduction:  Wrap the exercise band around a heavy, stable object  Stand and face away from where the band is anchored  Hold each end of the band in both hands with your elbows bent  Your elbows should not be behind your body  Keep your arms parallel to the floor and slowly straighten your elbows  Hold this position for as long as directed  Slowly return to the starting position  Follow up with your physical therapist as directed:  Write down your questions so you remember to ask them at your visits  © Copyright [employer] 2021 Information is for End User's use only and may not be sold, redistributed or otherwise used for commercial purposes  All illustrations and images included in CareNotes® are the copyrighted property of [employer]  or Dashawn Ashraf   The above information is an  only  It is not intended as medical advice for individual conditions or treatments  Talk to your doctor, nurse or pharmacist before following any medical regimen to see if it is safe and effective for you    Exercises for Internal and External Shoulder Rotation   AMBULATORY CARE:   Muscles worked during internal and external shoulder exercises:  Exercises for internal shoulder rotation work the muscles in your chest and front of your shoulder  Exercises for external shoulder rotation work the muscles in the back of your shoulder and upper back  Contact your healthcare provider if:   · You have sharp or worsening pain during exercise or at rest     · You have questions or concerns about your shoulder exercises  Before you exercise:  Warm up and stretch before you exercise  Walk or ride a stationary bike for 5 to 10 minutes to help you warm up  Stretching helps increase range of motion  It may also decrease muscle soreness and help prevent another injury  Your healthcare provider will tell you which of the following stretches to do:  · Crossover arm stretch:  Relax your shoulders  Hold your upper arm with the opposite hand  Pull your arm across your chest until you feel a stretch  Hold the stretch for 30 seconds  Return to the starting position  · Shoulder flexion stretch:  Stand facing a wall  Slowly walk your fingers up the wall until you feel a stretch  Hold the stretch for 30 seconds  Return to the starting position  · Sleeper stretch:  Lie on your injured side on a firm, flat surface  Bend the elbow of your injured arm 90° with your hand facing up  Use your arm that is not injured to slowly push your injured arm down  Stop when you feel a stretch at the back of your injured shoulder  Hold the stretch for 30 seconds  Slowly return to the starting position  How to exercise with a weight:  Your healthcare provider will tell you how much weight to exercise with  · Shoulder internal rotation:  Sit in a chair  Place a rolled up towel between your elbow and your side  Bend your elbow to 90°  Gently squeeze the towel with your elbow to prevent it from falling out  Hold the weight with your thumb pointing up   Slowly move the weight across your chest  Stop when your hand reaches your opposite arm  Hold this position for as many seconds as directed  Slowly return to the starting position  · Shoulder external rotation:  Lie on your side with your injured shoulder facing up  Bend your elbow 90°  Place a rolled up towel between your elbow and your side  Hold a weight in your hand  Gently squeeze the towel with your elbow to prevent it from falling out  Slowly rotate your arm outward, but keep your elbow bent  Stop when you feel a stretch  Hold this position for 30 seconds or as directed  Slowly return to the starting position  How to exercise with an exercise band:   · Shoulder internal rotation:  Tie one end of the exercise band to a heavy, secure object  Sit in a chair  Place a rolled up towel between your elbow and your side  Bend your elbow to 90°  Gently squeeze the towel with your elbow to prevent it from falling out  Slowly pull the band across your chest  Stop when your hand reaches your opposite arm  Hold this position for as many seconds as directed  Slowly return to the starting position  · Shoulder external rotation:  Hold one end of the exercise band on the side that is not injured  Place a rolled up towel between your elbow and your side  Bend your elbow 90°  Squeeze the towel with your elbow  Grab the end of the band and slowly turn your arm outward, but keep your elbow bent  Stop when you feel a stretch  Hold this position for 30 seconds or as directed  Slowly return to the starting position  Follow up with your physical therapist as directed:  Write down your questions so you remember to ask them at your visits  © Copyright [employer] 2021 Information is for End User's use only and may not be sold, redistributed or otherwise used for commercial purposes  All illustrations and images included in CareNotes® are the copyrighted property of [employer]  or Dashawn Ashraf   The above information is an  only   It is not intended as medical advice for individual conditions or treatments  Talk to your doctor, nurse or pharmacist before following any medical regimen to see if it is safe and effective for you

## 2021-09-24 NOTE — PROGRESS NOTES
Assessment/Plan:  1  Strain of right trapezius muscle, initial encounter     2  Right shoulder pain, unspecified chronicity  XR shoulder 2+ vw right       Scribe Attestation    I,:  Martina Lewis MA am acting as a scribe while in the presence of the attending physician :       I,:  Gill Suarez MD personally performed the services described in this documentation    as scribed in my presence :             I discussed with Eduardo Rowan that his signs and symptoms are consistent with a trapezius strain  I discussed with him that it is common for a shoulder injury to effect the entire shoulder  He has good range of motion and strength on exam  Treatment options were discussed in the form of a physician directed HEP for strengthening  He was provided with this and a theraband in the office today  Patient was advised to use heat/ice 20 minutes on 20 minutes off  We did discuss low weight and and high repetition exercises at the gym  He may follow up with me as needed  Subjective:   Theresa Alvarez is a 19 y o  male who presents to the office today for evaluation of right shoulder pain  Patient states appx 2 weeks ago he was in the gym doing a sitting dumb be;l curl when he picked up the weight and felt a immediate sharp shooting pain to the anterior aspect of his shoulder  He denies a pop or snap  He denies any bruising  Patient states since the injury, the pain has somewhat improved  He notes pain to the superior and posterior aspect  He notes increased pain and stiffness in the morning  He has been avoiding any heavy lifting  He has not been to the gym  He has not taken anything OTC for pain  He has been doing some home stretches  He denies prior surgery on the shoulder  He denies any numbness or tingling  Review of Systems   Constitutional: Negative for chills and fever  HENT: Negative for drooling and sneezing  Eyes: Negative for redness  Respiratory: Negative for cough and wheezing  Gastrointestinal: Negative for nausea and vomiting  Musculoskeletal: Negative for arthralgias, joint swelling and myalgias  Neurological: Negative for weakness and numbness  Psychiatric/Behavioral: Negative for behavioral problems  The patient is not nervous/anxious  Past Medical History:   Diagnosis Date    Allergic rhinitis        Past Surgical History:   Procedure Laterality Date    CIRCUMCISION         Family History   Problem Relation Age of Onset    No Known Problems Mother     No Known Problems Father     No Known Problems Sister     No Known Problems Brother     No Known Problems Maternal Aunt     No Known Problems Maternal Uncle     No Known Problems Paternal Aunt     No Known Problems Paternal Uncle     No Known Problems Maternal Grandmother     No Known Problems Maternal Grandfather     No Known Problems Paternal Grandmother     No Known Problems Paternal Grandfather        Social History     Occupational History    Not on file   Tobacco Use    Smoking status: Never Smoker    Smokeless tobacco: Never Used   Vaping Use    Vaping Use: Never used   Substance and Sexual Activity    Alcohol use: Yes     Comment: social    Drug use: Never    Sexual activity: Yes     Partners: Female     Birth control/protection: Condom Male         Current Outpatient Medications:     cetirizine (ZyrTEC) 10 mg tablet, Take 10 mg by mouth daily as needed for allergies, Disp: , Rfl:     No Known Allergies    Objective: There were no vitals filed for this visit  Right Shoulder Exam     Range of Motion   External rotation: 90   Internal rotation 90 degrees: 60     Muscle Strength   Abduction: 5/5   Internal rotation: 5/5   External rotation: 5/5     Tests   Saldaña test: negative    Other   Erythema: absent  Sensation: normal  Pulse: present    Comments:  - speed's  Pectoralis is palpable and intact             Physical Exam  Constitutional:       Appearance: He is well-developed     HENT: Head: Normocephalic and atraumatic  Eyes:      General:         Right eye: No discharge  Left eye: No discharge  Conjunctiva/sclera: Conjunctivae normal    Cardiovascular:      Rate and Rhythm: Normal rate  Pulmonary:      Effort: Pulmonary effort is normal  No respiratory distress  Musculoskeletal:      Cervical back: Normal range of motion and neck supple  Comments: As noted in HPI   Skin:     General: Skin is warm and dry  Neurological:      Mental Status: He is alert and oriented to person, place, and time  Psychiatric:         Behavior: Behavior normal          Thought Content: Thought content normal          Judgment: Judgment normal          I have personally reviewed pertinent films in PACS and my interpretation is as follows:x-ray right shoulder performed in the office today demonstrates no osseous abnormalities

## 2021-12-27 ENCOUNTER — TELEPHONE (OUTPATIENT)
Dept: FAMILY MEDICINE CLINIC | Facility: CLINIC | Age: 21
End: 2021-12-27

## 2021-12-28 ENCOUNTER — OFFICE VISIT (OUTPATIENT)
Dept: FAMILY MEDICINE CLINIC | Facility: CLINIC | Age: 21
End: 2021-12-28
Payer: COMMERCIAL

## 2021-12-28 VITALS
RESPIRATION RATE: 16 BRPM | DIASTOLIC BLOOD PRESSURE: 70 MMHG | BODY MASS INDEX: 28.31 KG/M2 | HEART RATE: 64 BPM | TEMPERATURE: 96.7 F | HEIGHT: 72 IN | SYSTOLIC BLOOD PRESSURE: 122 MMHG | WEIGHT: 209 LBS

## 2021-12-28 DIAGNOSIS — B34.9 VIRAL INFECTION, UNSPECIFIED: Primary | ICD-10-CM

## 2021-12-28 PROCEDURE — U0003 INFECTIOUS AGENT DETECTION BY NUCLEIC ACID (DNA OR RNA); SEVERE ACUTE RESPIRATORY SYNDROME CORONAVIRUS 2 (SARS-COV-2) (CORONAVIRUS DISEASE [COVID-19]), AMPLIFIED PROBE TECHNIQUE, MAKING USE OF HIGH THROUGHPUT TECHNOLOGIES AS DESCRIBED BY CMS-2020-01-R: HCPCS | Performed by: FAMILY MEDICINE

## 2021-12-28 PROCEDURE — U0005 INFEC AGEN DETEC AMPLI PROBE: HCPCS | Performed by: FAMILY MEDICINE

## 2021-12-28 PROCEDURE — 3008F BODY MASS INDEX DOCD: CPT | Performed by: FAMILY MEDICINE

## 2021-12-28 PROCEDURE — 99213 OFFICE O/P EST LOW 20 MIN: CPT | Performed by: FAMILY MEDICINE

## 2021-12-28 PROCEDURE — 1036F TOBACCO NON-USER: CPT | Performed by: FAMILY MEDICINE

## 2021-12-28 PROCEDURE — 3725F SCREEN DEPRESSION PERFORMED: CPT | Performed by: FAMILY MEDICINE

## 2021-12-29 LAB — SARS-COV-2 RNA RESP QL NAA+PROBE: POSITIVE

## 2023-02-20 ENCOUNTER — OFFICE VISIT (OUTPATIENT)
Dept: URGENT CARE | Facility: CLINIC | Age: 23
End: 2023-02-20

## 2023-02-20 ENCOUNTER — APPOINTMENT (OUTPATIENT)
Dept: RADIOLOGY | Facility: CLINIC | Age: 23
End: 2023-02-20

## 2023-02-20 VITALS
WEIGHT: 223 LBS | HEART RATE: 76 BPM | TEMPERATURE: 98.3 F | OXYGEN SATURATION: 98 % | BODY MASS INDEX: 30.2 KG/M2 | RESPIRATION RATE: 18 BRPM | HEIGHT: 72 IN

## 2023-02-20 DIAGNOSIS — M25.531 RIGHT WRIST PAIN: ICD-10-CM

## 2023-02-20 DIAGNOSIS — M25.531 RIGHT WRIST PAIN: Primary | ICD-10-CM

## 2023-02-20 DIAGNOSIS — S63.501A SPRAIN OF RIGHT WRIST, INITIAL ENCOUNTER: ICD-10-CM

## 2023-02-20 NOTE — PATIENT INSTRUCTIONS
Xray appears negative for any fracture  Will follow up with radiologist report when available  Recommend elevating body part, icing the area every 2 hours for 20-30 minutes, take Ibuprofen every 6-8 hours to reduce inflammation  If not improving over the next week, follow up with PCP or orthopedics  Wrist Sprain   WHAT YOU NEED TO KNOW:   A wrist sprain happens when one or more ligaments in your wrist stretch or tear  Ligaments are tough tissues that connect bones and keep them in place, and support your joints  DISCHARGE INSTRUCTIONS:   Return to the emergency department if:   You have severe pain or swelling  Your injured wrist is red or has red streaks spreading from the injured area  You have new trouble moving your hands, fingers, or wrist     Your wrist, hand, or fingers feel cold or numb  Your fingernails turn blue or gray  Call your doctor if:   Your symptoms get worse  You have pain and swelling for more than 48 hours  You have questions or concerns about your condition or care  Medicines: You may need any of the following:  NSAIDs , such as ibuprofen, help decrease swelling, pain, and fever  NSAIDs can cause stomach bleeding or kidney problems in certain people  If you take blood thinner medicine, always ask your healthcare provider if NSAIDs are safe for you  Always read the medicine label and follow directions  Acetaminophen  decreases pain and fever  It is available without a doctor's order  Ask how much to take and how often to take it  Follow directions  Read the labels of all other medicines you are using to see if they also contain acetaminophen, or ask your doctor or pharmacist  Acetaminophen can cause liver damage if not taken correctly  Take your medicine as directed  Contact your healthcare provider if you think your medicine is not helping or if you have side effects  Tell your provider if you are allergic to any medicine   Keep a list of the medicines, vitamins, and herbs you take  Include the amounts, and when and why you take them  Bring the list or the pill bottles to follow-up visits  Carry your medicine list with you in case of an emergency  Self-care:   Rest  your wrist for at least 48 hours  Avoid activities that cause pain  Ice  your wrist for 15 to 20 minutes every hour or as directed  Use an ice pack, or put crushed ice in a plastic bag  Cover it with a towel before you put it on your wrist  Ice helps prevent tissue damage and decreases swelling and pain  Compress  your wrist with an elastic bandage  This will help decrease swelling, support your wrist, and help it heal  Wear your wrist wrap as directed  The elastic bandage should be snug but not tight  Elevate  your wrist above the level of your heart as often as you can  This will help decrease swelling and pain  Prop your wrist on pillows or blankets to keep it elevated comfortably  Wrist support: You may need to wear a splint or cast to support your wrist and prevent more damage  Wear your splint as directed  Ask for instructions on how to bathe while you are wearing a splint or cast   Physical therapy:  Your healthcare provider may recommend that you go to physical therapy  A physical therapist teaches you exercises to help improve movement and strength, and to decrease pain  Follow up with your doctor as directed:  Write down your questions so you remember to ask them during your visits  © Copyright [employer] 2022 Information is for End User's use only and may not be sold, redistributed or otherwise used for commercial purposes  The above information is an  only  It is not intended as medical advice for individual conditions or treatments  Talk to your doctor, nurse or pharmacist before following any medical regimen to see if it is safe and effective for you

## 2023-02-20 NOTE — PROGRESS NOTES
Assessment/Plan    Right wrist pain [M25 531]  1  Right wrist pain  XR wrist 3+ vw right    Ambulatory referral to Orthopedic Surgery      2  Sprain of right wrist, initial encounter          ACE wrap given   See wrap up     Xray appears negative for any fracture  Will follow up with radiologist report when available  Recommend elevating body part, icing the area every 2 hours for 20-30 minutes, take Ibuprofen every 6-8 hours to reduce inflammation  If not improving over the next week, follow up with PCP or orthopedics  Subjective:     Patient ID: Dolores Morirs is a 23 y o  male  Reason For Visit / Chief Complaint  Chief Complaint   Patient presents with   • Wrist Pain     Painful rt wrist has pain with ROM flexion etc           Dolores Morris is a 23year old male with no significant medical history presenting for right wrist pain  Patient states he was at work and began to notice wrist pain around 10 pm last night  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  Patient states the pain is a 2/10 at rest and a 6/10 with movement  He has not iced or taken anything OTC for the pain  Patient denies any injury or inciting event to the wrist  He denies any previous injuries to the wrist  Patient denies any numbness or tingling in the wrist or fingers  He denies any bruising, redness, or swelling         Past Medical History:   Diagnosis Date   • Allergic rhinitis        Past Surgical History:   Procedure Laterality Date   • CIRCUMCISION         Family History   Problem Relation Age of Onset   • No Known Problems Mother    • No Known Problems Father    • No Known Problems Sister    • No Known Problems Brother    • No Known Problems Maternal Aunt    • No Known Problems Maternal Uncle    • No Known Problems Paternal Aunt    • No Known Problems Paternal Uncle    • No Known Problems Maternal Grandmother    • No Known Problems Maternal Grandfather    • No Known Problems Paternal Grandmother    • No Known Problems Paternal Grandfather        Review of Systems   Constitutional: Negative for chills and fever  HENT: Negative for ear pain and sore throat  Eyes: Negative for pain and visual disturbance  Respiratory: Negative for cough and shortness of breath  Cardiovascular: Negative for chest pain and palpitations  Gastrointestinal: Negative for abdominal pain and vomiting  Genitourinary: Negative for dysuria and hematuria  Musculoskeletal: Positive for arthralgias and myalgias  Negative for back pain and joint swelling  Skin: Negative for color change and rash  Neurological: Negative for seizures and syncope  All other systems reviewed and are negative  Objective:    Pulse 76   Temp 98 3 °F (36 8 °C)   Resp 18   Ht 6' (1 829 m)   Wt 101 kg (223 lb)   SpO2 98%   BMI 30 24 kg/m²     Physical Exam  Vitals and nursing note reviewed  Constitutional:       General: He is not in acute distress  Appearance: Normal appearance  He is not ill-appearing  HENT:      Head: Normocephalic and atraumatic  Nose: Nose normal       Mouth/Throat:      Mouth: Mucous membranes are moist    Eyes:      Pupils: Pupils are equal, round, and reactive to light  Cardiovascular:      Rate and Rhythm: Normal rate and regular rhythm  Pulses: Normal pulses  Heart sounds: Normal heart sounds  No murmur heard  No friction rub  No gallop  Pulmonary:      Effort: Pulmonary effort is normal  No respiratory distress  Breath sounds: Normal breath sounds  No wheezing  Musculoskeletal:         General: Tenderness (Tenderness at the distal ulna ) present  No swelling, deformity or signs of injury  Normal range of motion  Right wrist: Tenderness present  No bony tenderness or snuff box tenderness  Normal pulse  Arms:       Right lower leg: No edema  Left lower leg: No edema  Skin:     General: Skin is warm and dry        Findings: No bruising or erythema  Neurological:      General: No focal deficit present  Mental Status: He is alert and oriented to person, place, and time     Psychiatric:         Mood and Affect: Mood normal          Behavior: Behavior normal

## 2023-02-28 ENCOUNTER — OFFICE VISIT (OUTPATIENT)
Dept: OBGYN CLINIC | Facility: CLINIC | Age: 23
End: 2023-02-28

## 2023-02-28 VITALS
WEIGHT: 217 LBS | TEMPERATURE: 98.8 F | HEART RATE: 64 BPM | HEIGHT: 72 IN | BODY MASS INDEX: 29.39 KG/M2 | SYSTOLIC BLOOD PRESSURE: 118 MMHG | DIASTOLIC BLOOD PRESSURE: 80 MMHG

## 2023-02-28 DIAGNOSIS — S69.81XA TFCC (TRIANGULAR FIBROCARTILAGE COMPLEX) INJURY, RIGHT, INITIAL ENCOUNTER: ICD-10-CM

## 2023-02-28 DIAGNOSIS — M25.531 RIGHT WRIST PAIN: Primary | ICD-10-CM

## 2023-02-28 NOTE — PATIENT INSTRUCTIONS
Patellofemoral Pain Syndrome Exercises   WHAT YOU NEED TO KNOW:   What do I need to know about patellofemoral pain syndrome (PFPS) exercises? Your healthcare provider will tell you when to start doing PFPS exercises  Your provider or a physical therapist will teach you exercises to strengthen the muscles in your legs, including around your knee  Strong leg muscles can help prevent more injuries  What do I need to know about exercise safety? Only do exercises as instructed  Your healthcare provider or physical therapist will tell you which exercises to do and how many times to do them  Stop if you feel pain  You may feel some discomfort when you start, but you should not feel pain  Discomfort should get better as you continue the exercises  If you feel pain during an exercise, stop and call your physical therapist or healthcare provider right away  How do I perform PFPS exercises safely? Your healthcare provider or physical therapist will tell you which of the following exercises to do, and how often to do them  Wall lean:  Stand with your side against the wall  Bend and raise the leg closest to the wall  Press your knee into the wall  Hold the position as long as directed  Repeat on the other side  Quad set exercise:  Lie on a flat, firm surface  Bend your left leg until your foot is flat on the floor  Keep your right leg straight  Tighten the top of your right thigh and hold for 10 to 20 seconds, and then relax  Repeat this exercise 5 to 10 times  Then repeat on your other leg  Short arc quad (SAQ) exercise:  Lie on a flat, firm surface  Place a rolled up towel or foam roller under your injured knee  Bend your knee  Tighten your quad muscle and lift your foot as you straighten your leg  Hold for 5 seconds and then return to the starting position  Keep your knee touching the towel or roller at all times  Straight leg lift:  Lie on a flat, firm surface   Bend your left leg until your foot is flat on the floor  Raise your right leg several inches off the floor and hold for 5 to 10 seconds  Lower your leg slowly  Repeat this exercise 5 to 10 times  Then repeat on your other leg  Clam exercise:  Lie on your side so your injured side is on top  Bend your knees  Keep your heels together during this exercise  Slowly raise your top knee toward the ceiling  Then lower your leg so your knees are together  Single leg stance: Your goal is to put weight on your injured leg  First stand with your weight evenly on both feet  You may hold on to a chair or wall for balance  Do not lean to the side  Then lift your foot so all your weight is on your injured leg  Ask your healthcare provider how long to hold this position  Single leg squat:  Stand on one leg  Raise the other leg straight out with toes pointed up  Bend the standing leg into a squat and slowly come back to a standing position  Ball bridge:  Lie on your back with legs straight and ankles on the ball  Keep your legs straight  Slowly raise your hips off the floor by making your buttock muscles tight  Hold for 5 seconds  Repeat as directed  Ball bridge with knee flexion:  Lie on your back  Bend your knees and put your feet on the ball  Lift your hips off the floor by making your buttocks muscles tight  Make sure to keep your feet on the ball and knees bent  Standing hip abduction with band:  Stand with legs hip width apart with a band around your ankles  Lift your leg out to the side and stretch the band  Bring your leg back down  Repeat on the other side  CARE AGREEMENT:   You have the right to help plan your care  Learn about your health condition and how it may be treated  Discuss treatment options with your healthcare providers to decide what care you want to receive  You always have the right to refuse treatment  The above information is an  only   It is not intended as medical advice for individual conditions or treatments  Talk to your doctor, nurse or pharmacist before following any medical regimen to see if it is safe and effective for you  © Copyright Alicia Marchi 2022 Information is for End User's use only and may not be sold, redistributed or otherwise used for commercial purposes

## 2023-02-28 NOTE — PROGRESS NOTES
Assessment/Plan:  1  Right wrist pain  Ambulatory referral to Orthopedic Surgery    Ambulatory Referral to Occupational Therapy      2  TFCC (triangular fibrocartilage complex) injury, right, initial encounter  Ambulatory Referral to [facility] injured his right wrist on 2/19/2023 without a significant trauma  He has developed TFCC symptoms and will be started in occupational therapy  He will ice the wrist 20 minutes on 1 hour off 3 times a day  He will use an oral anti-inflammatory such as Aleve 1 tablet twice daily with food stopping and calling if any stomach upset occurs for the next 10 days  He may continue to work and go to the gym as tolerated using pain as his guide  He will recheck with a hand surgeon in 3 weeks if his symptoms have not completely resolved  He will be provided a physician directed home exercise program in his after visit summary for a patellofemoral pain syndrome program   If this does not help his symptoms he may schedule follow-up appointment with Dr Wilfrid Alejandro        Subjective:   Patient ID: Geeta Whitmore is a 23 y o  male with right wrist pain  The pain started at approximately 10 PM on 2/19/2023  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  The symptoms progressively worsened until he went to the urgent care on 2/20/2023 and had x-rays taken which were negative for fracture  Patient does not report any numbness or tingling into the wrist or fingers  No fever or chills are noted  The pain was located on the ulnar aspect of the right wrist   Patient reports he has some popping in the TFCC region  He reports this has been present prior to this acute flare of pain but was not usually painful  He is left-hand dominant  He reports his symptoms have improved since his urgent care visit but not completely resolved  He has stopped lifting weights but is continue to work without problems      Alex Cormier reports at the end of the visit he is having some anterior knee discomfort with working out at the gym and describes patellofemoral pain syndrome symptoms  Review of Systems   Constitutional: Negative for chills and fever  HENT: Negative for congestion and rhinorrhea  Eyes: Negative for discharge and redness  Respiratory: Negative for cough and shortness of breath  Cardiovascular: Negative for chest pain and leg swelling  Gastrointestinal: Negative for abdominal distention and nausea  Neurological: Negative for dizziness and facial asymmetry  Psychiatric/Behavioral: Negative for confusion and hallucinations  All other systems reviewed and are negative          Past Medical History:   Diagnosis Date   • Allergic rhinitis        Past Surgical History:   Procedure Laterality Date   • CIRCUMCISION         Family History   Problem Relation Age of Onset   • No Known Problems Mother    • No Known Problems Father    • No Known Problems Sister    • No Known Problems Brother    • No Known Problems Maternal Aunt    • No Known Problems Maternal Uncle    • No Known Problems Paternal Aunt    • No Known Problems Paternal Uncle    • No Known Problems Maternal Grandmother    • No Known Problems Maternal Grandfather    • No Known Problems Paternal Grandmother    • No Known Problems Paternal Grandfather        Social History     Occupational History   • Not on file   Tobacco Use   • Smoking status: Never   • Smokeless tobacco: Never   • Tobacco comments:     Cigar on special occasions    Vaping Use   • Vaping Use: Never used   Substance and Sexual Activity   • Alcohol use: Yes     Comment: social   • Drug use: Never   • Sexual activity: Yes     Partners: Female     Birth control/protection: Condom Male         Current Outpatient Medications:   •  cetirizine (ZyrTEC) 10 mg tablet, Take 10 mg by mouth daily as needed for allergies, Disp: , Rfl:     No Known Allergies    Objective:  Vitals:    02/28/23 0731   BP: 118/80   Pulse: 64 Temp: 98 8 °F (37 1 °C)       Ortho Exam    Physical Exam  Constitutional:       General: He is not in acute distress  Appearance: Normal appearance  HENT:      Head: Normocephalic and atraumatic  Nose: Nose normal    Cardiovascular:      Rate and Rhythm: Normal rate  Pulses: Normal pulses  Pulmonary:      Effort: Pulmonary effort is normal  No respiratory distress  Breath sounds: No wheezing  Skin:     General: Skin is warm and dry  Coloration: Skin is not jaundiced  Findings: No erythema or rash  Neurological:      General: No focal deficit present  Mental Status: He is alert and oriented to person, place, and time  Psychiatric:         Mood and Affect: Mood normal          Behavior: Behavior normal          Thought Content: Thought content normal          Judgment: Judgment normal      Right wrist has no skin breakdown lesions or signs of infection  He has full active and passive range of motion of the right wrist to flexion, extension, ulnar deviation, radial deviation, supination, and pronation  He has mild pain against resistance to ulnar deviation  He has mild tenderness at the TFCC without significant crepitus appreciated  He has 4 out of 5 strength ulnar deviation otherwise 5 out of 5 strength throughout  He is nontender to palpation at the ulnar styloid or distal radius  He has no anatomical snuffbox tenderness  He is nontender at the cubital tunnel and has full right elbow range of motion  I have personally reviewed pertinent films in PACS and my interpretation is agreement with radiologist interpretation      Study Result    Narrative & Impression   RIGHT WRIST     INDICATION:   M25 531: Pain in right wrist      COMPARISON:  None     VIEWS:  XR WRIST 3+ VW RIGHT         FINDINGS:     There is no acute fracture or dislocation      No significant degenerative changes      No lytic or blastic osseous lesion      Soft tissues are unremarkable      IMPRESSION:     No acute osseous abnormality            Workstation performed: [id number]          Urgent care notes from 2/20/2023 were reviewed by myself in the office today

## 2023-03-07 ENCOUNTER — OFFICE VISIT (OUTPATIENT)
Dept: URGENT CARE | Facility: CLINIC | Age: 23
End: 2023-03-07

## 2023-03-07 VITALS
TEMPERATURE: 98 F | WEIGHT: 213 LBS | SYSTOLIC BLOOD PRESSURE: 126 MMHG | OXYGEN SATURATION: 100 % | HEIGHT: 72 IN | RESPIRATION RATE: 18 BRPM | HEART RATE: 70 BPM | BODY MASS INDEX: 28.85 KG/M2 | DIASTOLIC BLOOD PRESSURE: 69 MMHG

## 2023-03-07 DIAGNOSIS — J02.9 SORE THROAT: ICD-10-CM

## 2023-03-07 DIAGNOSIS — J02.0 ACUTE STREPTOCOCCAL PHARYNGITIS: Primary | ICD-10-CM

## 2023-03-07 LAB — S PYO AG THROAT QL: POSITIVE

## 2023-03-07 RX ORDER — AMOXICILLIN 500 MG/1
500 CAPSULE ORAL EVERY 12 HOURS SCHEDULED
Qty: 20 CAPSULE | Refills: 0 | Status: SHIPPED | OUTPATIENT
Start: 2023-03-07 | End: 2023-03-17

## 2023-03-07 NOTE — PROGRESS NOTES
[facility]        NAME: Jessica Duke is a 23 y o  male  : 2000    MRN: [id number]  DATE: 2023  TIME: 4:06 PM    Assessment and Plan   Acute streptococcal pharyngitis [J02 0]  1  Acute streptococcal pharyngitis  amoxicillin (AMOXIL) 500 mg capsule      2  Sore throat  POCT rapid strepA        Patient Instructions   Strep throat  Rapid strep positive  rx amoxicillin twice daily x 10 days sent via EMR  Recommend warm salt water gargles  tylenol/ibuprofen as needed for pain/fever  Rest, fluids and supportive care    Follow up with PCP in 3-5 days  Proceed to  ER if symptoms worsen  Chief Complaint     Chief Complaint   Patient presents with   • Headache     Head congestion , PND, last week had sore throat  History of Present Illness       Alok Geller is a 26-year-old male who presents to the clinic complaining of sore throat x1 week  He states he has had sore throat for the last week but in the last few days he started with nasal congestion postnasal drip and headache  He states the sore throat is progressively worsening and he is not able to swallow food as much and therefore decreased appetite  He denies any fever, chills, ear pain, cough, shortness of breath, nausea, vomiting, diarrhea, loss of taste or smell, recent travel, or exposure to anyone known COVID-positive  Review of Systems   Review of Systems   Constitutional: Positive for appetite change  Negative for chills and fever  HENT: Positive for congestion, postnasal drip and sore throat  Negative for ear pain, rhinorrhea, sinus pressure and sinus pain  Respiratory: Negative for cough and shortness of breath  Gastrointestinal: Negative for diarrhea, rectal pain and vomiting  Musculoskeletal: Negative for myalgias  Neurological: Positive for headaches           Current Medications       Current Outpatient Medications:   •  amoxicillin (AMOXIL) 500 mg capsule, Take 1 capsule (500 mg total) by mouth every 12 (twelve) hours for 10 days, Disp: 20 capsule, Rfl: 0  •  cetirizine (ZyrTEC) 10 mg tablet, Take 10 mg by mouth daily as needed for allergies, Disp: , Rfl:     Current Allergies     Allergies as of 03/07/2023   • (No Known Allergies)            The following portions of the patient's history were reviewed and updated as appropriate: allergies, current medications, past family history, past medical history, past social history, past surgical history and problem list      Past Medical History:   Diagnosis Date   • Allergic rhinitis        Past Surgical History:   Procedure Laterality Date   • CIRCUMCISION         Family History   Problem Relation Age of Onset   • No Known Problems Mother    • No Known Problems Father    • No Known Problems Sister    • No Known Problems Brother    • No Known Problems Maternal Aunt    • No Known Problems Maternal Uncle    • No Known Problems Paternal Aunt    • No Known Problems Paternal Uncle    • No Known Problems Maternal Grandmother    • No Known Problems Maternal Grandfather    • No Known Problems Paternal Grandmother    • No Known Problems Paternal Grandfather          Medications have been verified  Objective   /69   Pulse 70   Temp 98 °F (36 7 °C)   Resp 18   Ht 6' (1 829 m)   Wt 96 6 kg (213 lb)   SpO2 100%   BMI 28 89 kg/m²   No LMP for male patient  Physical Exam     Physical Exam  Vitals and nursing note reviewed  Constitutional:       General: He is not in acute distress  Appearance: Normal appearance  He is not ill-appearing  HENT:      Right Ear: Tympanic membrane, ear canal and external ear normal       Left Ear: Tympanic membrane, ear canal and external ear normal       Nose: Congestion present  Mouth/Throat:      Mouth: Mucous membranes are moist       Pharynx: Uvula midline  Posterior oropharyngeal erythema present  No pharyngeal swelling, oropharyngeal exudate or uvula swelling  Tonsils: No tonsillar exudate  1+ on the right  1+ on the left  Cardiovascular:      Rate and Rhythm: Normal rate and regular rhythm  Heart sounds: Normal heart sounds  Pulmonary:      Effort: Pulmonary effort is normal       Breath sounds: Normal breath sounds  Lymphadenopathy:      Cervical: Cervical adenopathy present  Neurological:      Mental Status: He is alert and oriented to person, place, and time     Psychiatric:         Mood and Affect: Mood normal          Behavior: Behavior normal

## 2023-03-21 ENCOUNTER — OFFICE VISIT (OUTPATIENT)
Dept: OBGYN CLINIC | Facility: CLINIC | Age: 23
End: 2023-03-21

## 2023-03-21 VITALS
SYSTOLIC BLOOD PRESSURE: 131 MMHG | DIASTOLIC BLOOD PRESSURE: 64 MMHG | HEART RATE: 62 BPM | WEIGHT: 213 LBS | BODY MASS INDEX: 28.85 KG/M2 | HEIGHT: 72 IN

## 2023-03-21 DIAGNOSIS — S69.81XA TFCC (TRIANGULAR FIBROCARTILAGE COMPLEX) INJURY, RIGHT, INITIAL ENCOUNTER: ICD-10-CM

## 2023-03-21 DIAGNOSIS — S63.501D SPRAIN OF RIGHT WRIST, SUBSEQUENT ENCOUNTER: Primary | ICD-10-CM

## 2023-03-21 PROBLEM — S63.501A SPRAIN OF RIGHT WRIST: Status: ACTIVE | Noted: 2023-03-21

## 2023-03-21 NOTE — PROGRESS NOTES
Chief Complaint     Right wrist pain       History of Present Illness     Carleen Lima is a 23 y o  left hand dominant male presents for initial evaluation of his right wrist  He has previously seen Brian Riddle PA-C for evaluation and treatment  He has been using OTC analgesics and ice with minimal relief of his symptoms  Patient notes increased pain with activities such as push ups  He denies any injury or trauma that started his symptoms  His symptoms are usually worse in the mornings and improve as the day goes on  He has not tried splinting or therapy  Patient is currently  working as EMS  Past Medical History:   Diagnosis Date   • Allergic rhinitis    • TFCC (triangular fibrocartilage complex) injury, right, initial encounter 3/21/2023       Past Surgical History:   Procedure Laterality Date   • CIRCUMCISION         No Known Allergies    Current Outpatient Medications on File Prior to Visit   Medication Sig Dispense Refill   • cetirizine (ZyrTEC) 10 mg tablet Take 10 mg by mouth daily as needed for allergies       No current facility-administered medications on file prior to visit  Social History     Tobacco Use   • Smoking status: Never   • Smokeless tobacco: Never   • Tobacco comments:     Cigar on special occasions    Vaping Use   • Vaping Use: Never used   Substance Use Topics   • Alcohol use: Yes     Comment: social   • Drug use: Never       Family History   Problem Relation Age of Onset   • No Known Problems Mother    • No Known Problems Father    • No Known Problems Sister    • No Known Problems Brother    • No Known Problems Maternal Aunt    • No Known Problems Maternal Uncle    • No Known Problems Paternal Aunt    • No Known Problems Paternal Uncle    • No Known Problems Maternal Grandmother    • No Known Problems Maternal Grandfather    • No Known Problems Paternal Grandmother    • No Known Problems Paternal Grandfather        Review of Systems     As stated in the HPI   All other systems were reviewed and are negative  Physical Exam     /64   Pulse 62   Ht 6' (1 829 m)   Wt 96 6 kg (213 lb)   BMI 28 89 kg/m²     GENERAL: This is a well-developed, well-nourished, age-appropriate patient in no acute distress  The patient is alert and oriented x3  Pleasant and cooperative  Eyes: Anicteric sclerae  Extraocular movements appear intact  HENT: Nares are patent with no drainage  Lungs: There is equal chest rise on inspection  Breathing is non-labored with no audible wheezing  Cardiovascular: No cyanosis  No upper extremity lymphadema  Skin: Skin is warm to touch  No obvious skin lesions or rashes other than described below  Neurologic: No ataxia  Psychiatric: Mood and affect are appropriate  Right Hand Exam     Range of Motion   The patient has normal right wrist ROM  Muscle Strength   The patient has normal right wrist strength  Wrist extension: 5/5   Wrist flexion: 5/5   : 5/5     Other   Erythema: absent  Scars: absent  Sensation: normal  Pulse: present    Comments:  Able to make composite fist   Full pronation and supination   No edema or ecchymosis present   5/5 radial and ulnar deviation    DRUJ stable in pronation/supination/neutral   Compared to contralateral wrist  foveal tenderness & distal ulna TTP  No TTP ECU                    Data Review     Labs:  none    Electrodiagnostic Testing:  none    Imaging: I personally reviewed the images in PACS  XR of the right wrist taken on 2/20/23 demonstrates normal osseous anatomy    Assessment and Plan      Diagnoses and all orders for this visit:    Sprain of right wrist, subsequent encounter  -     Ambulatory Referral to Occupational Therapy; Future  -     Cock Up Wrist Splint    TFCC (triangular fibrocartilage complex) injury, right, initial encounter  -     Ambulatory Referral to Occupational Therapy;  Future  -     Cock Up Wrist Splint         Adan Kurtz is a 23 y o  male who presents with right ulnar sided wrist pain  Suspected TFCC sprain  · Discussed that due to his vague ulnar sided wrist pain, I recommend splinting at night to prevent him from putting is wrist in aggravating positions  Wrist cock up brace provided to the patient today  · I also recommend a [place] splint during the day to alleviate his discomfort  Information on the brace provided to the patient today  · Reviewed that if symptoms progress full immobilization may be warranted  · Discussed he can complete all activities to tolerance and progress back to full activity level   · I offered formal occupational therapy to improve his wrist strength and symptoms  Patient is agreeable to attending        Follow Up: 6 weeks/PRN    To Do Next Visit: repeat evaluation     PROCEDURES PERFORMED:  Procedures  No Procedures performed today     Scribe Attestation    I,:  Eunice Carey am acting as a scribe while in the presence of the attending physician :       I,:  Osmin Yao MD personally performed the services described in this documentation    as scribed in my presence :

## 2023-05-10 ENCOUNTER — HOSPITAL ENCOUNTER (EMERGENCY)
Facility: HOSPITAL | Age: 23
Discharge: HOME | End: 2023-05-10
Attending: EMERGENCY MEDICINE

## 2023-05-10 VITALS
OXYGEN SATURATION: 99 % | HEART RATE: 64 BPM | RESPIRATION RATE: 16 BRPM | TEMPERATURE: 97.4 F | DIASTOLIC BLOOD PRESSURE: 72 MMHG | SYSTOLIC BLOOD PRESSURE: 131 MMHG
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
## 2023-05-10 DIAGNOSIS — T23.102A: ICD-10-CM

## 2023-05-10 DIAGNOSIS — M79.642 LEFT HAND PAIN: ICD-10-CM

## 2023-05-10 RX ORDER — BACITRACIN, NEOMYCIN, POLYMYXIN B 400; 3.5; 5 [USP'U]/G; MG/G; [USP'U]/G
OINTMENT TOPICAL 2 TIMES DAILY
Qty: 15 G | Refills: 0 | Status: SHIPPED | OUTPATIENT
Start: 2023-05-10 | End: 2023-05-17
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
## 2023-11-24 ENCOUNTER — TELEPHONE (OUTPATIENT)
Age: 23
End: 2023-11-24

## 2023-11-24 NOTE — TELEPHONE ENCOUNTER
Caller: Patient    Doctor: Dr. Colette Aviles    Reason for call: Patient calling stating that he saw Dr. Colette Aviles on 3/21/23 in [place]. He is currently applying for the [facility] and he is asking if a note can be placed in his MyChart stating that he was cleared and he has no restrictions in regard to his previous injury. Any questions patient can be reached at the number below.     Call back#: [phone]

## 2023-11-27 NOTE — TELEPHONE ENCOUNTER
Caller: Alexa Marie     Doctor:  Dr Raeann Bass     Reason for call: The patient Is calling back due to needing a note for work. I did read him the messages below but he feels he should not have to come in just for a note. He was told to come back if there were any problems, and there are not. May he just have a note that states no restrictions were ever given to him, if possible.  Thank you   Please place in [phone]     Call back#: [phone]

## 2023-11-30 ENCOUNTER — TELEPHONE (OUTPATIENT)
Dept: OBGYN CLINIC | Facility: HOSPITAL | Age: 23
End: 2023-11-30

## 2023-11-30 NOTE — TELEPHONE ENCOUNTER
Caller: Patient    Doctor: Zachary Her    Reason for call: Patient needs a copy of his note from 11/29/23 emailed to him. He was unable to see it in his MyChart. Email is "[email or web address]". Thank you.     Call back#: [phone]

## 2024-01-30 ENCOUNTER — OFFICE VISIT (OUTPATIENT)
Dept: URGENT CARE | Facility: CLINIC | Age: 24
End: 2024-01-30
Payer: COMMERCIAL

## 2024-01-30 VITALS
DIASTOLIC BLOOD PRESSURE: 80 MMHG | SYSTOLIC BLOOD PRESSURE: 124 MMHG | OXYGEN SATURATION: 99 % | RESPIRATION RATE: 18 BRPM | HEIGHT: 72 IN | BODY MASS INDEX: 29.8 KG/M2 | WEIGHT: 220 LBS | HEART RATE: 79 BPM | TEMPERATURE: 97.3 F

## 2024-01-30 DIAGNOSIS — S91.341A PUNCTURE WOUND OF RIGHT FOOT WITH FOREIGN BODY, INITIAL ENCOUNTER: Primary | ICD-10-CM

## 2024-01-30 PROCEDURE — 99213 OFFICE O/P EST LOW 20 MIN: CPT | Performed by: FAMILY MEDICINE

## 2024-01-30 PROCEDURE — 28190 REMOVAL OF FOOT FOREIGN BODY: CPT | Performed by: FAMILY MEDICINE

## 2024-01-30 NOTE — PROGRESS NOTES
"  [facility]        NAME: Shun Ro is a 23 y.o. male  : 2000    MRN: [id number]  DATE: 2024  TIME: 5:36 PM    Assessment and Plan   Puncture wound of right foot with foreign body, initial encounter [S91.341A]  1. Puncture wound of right foot with foreign body, initial encounter  Foreign body removal        Universal Protocol:  Consent: Verbal consent obtained.  Risks and benefits: risks, benefits and alternatives were discussed  Consent given by: patient  Time out: Immediately prior to procedure a \"time out\" was called to verify the correct patient, procedure, equipment, support staff and site/side marked as required.  Patient understanding: patient states understanding of the procedure being performed  Required items: required blood products, implants, devices, and special equipment available  Patient identity confirmed: verbally with patient  Foreign body removal    Date/Time: 2024 4:45 PM    Performed by: Shar Brown MD  Authorized by: Shar Brown MD  Body area: skin  General location: lower extremity  Location details: right foot  Anesthesia: local infiltration    Anesthesia:  Local Anesthetic: lidocaine 2% without epinephrine  Anesthetic total: 1 mL  Patient restrained: no  Patient cooperative: yes  Tendon involvement: none  Complexity: simple  1 objects recovered.  Objects recovered: wooden splinter (3.5cm)  Post-procedure assessment: foreign body removed  Patient tolerance: patient tolerated the procedure well with no immediate complications  Comments: TDap up to date.  No antibx needed.  Bandaid applied.      Patient Instructions     Follow up with PCP in 3-5 days.  Proceed to  ER if symptoms worsen.    Chief Complaint     Chief Complaint   Patient presents with    Foreign Body in Skin     Wooden splinter still imbedded bottom R foot from last night - from kitchen floor. Tried to remove         History of Present Illness       23-year-old male presents " today due to a splinter in the right foot obtained last night while trying to get water from his fridge.  Has wooden toyin.  Attempted and failed removing the splinter last night.  Antalgic gait.    Foreign Body in Skin  Pertinent negatives include no chills or fever.       Review of Systems   Review of Systems   Constitutional:  Negative for chills and fever.   Respiratory:  Negative for shortness of breath.    Musculoskeletal:  Positive for gait problem.   Skin:  Positive for wound. Negative for color change.   Neurological:  Negative for dizziness.     Current Medications       Current Outpatient Medications:     cetirizine (ZyrTEC) 10 mg tablet, Take 10 mg by mouth daily as needed for allergies, Disp: , Rfl:     Current Allergies     Allergies as of 01/30/2024    (No Known Allergies)            The following portions of the patient's history were reviewed and updated as appropriate: allergies, current medications, past family history, past medical history, past social history, past surgical history and problem list.     Past Medical History:   Diagnosis Date    Allergic rhinitis     TFCC (triangular fibrocartilage complex) injury, right, initial encounter 3/21/2023       Past Surgical History:   Procedure Laterality Date    CIRCUMCISION      MOUTH SURGERY         Family History   Problem Relation Age of Onset    No Known Problems Mother     No Known Problems Father     No Known Problems Sister     No Known Problems Brother     No Known Problems Maternal Aunt     No Known Problems Maternal Uncle     No Known Problems Paternal Aunt     No Known Problems Paternal Uncle     No Known Problems Maternal Grandmother     No Known Problems Maternal Grandfather     No Known Problems Paternal Grandmother     No Known Problems Paternal Grandfather          Medications have been verified.        Objective   /80   Pulse 79   Temp (!) 97.3 °F (36.3 °C)   Resp 18   Ht 6' (1.829 m)   Wt 99.8 kg (220 lb)   SpO2 99%    BMI 29.84 kg/m²   No LMP for male patient.       Physical Exam     Physical Exam  Vitals and nursing note reviewed.   Constitutional:       General: He is in acute distress.      Appearance: Normal appearance. He is normal weight. He is not ill-appearing, toxic-appearing or diaphoretic.   HENT:      Head: Normocephalic and atraumatic.   Eyes:      General:         Right eye: No discharge.         Left eye: No discharge.      Conjunctiva/sclera: Conjunctivae normal.   Pulmonary:      Effort: Pulmonary effort is normal.   Skin:     General: Skin is warm.      Findings: Lesion (Small bump on the sole of the right foot.) present.   Neurological:      General: No focal deficit present.      Mental Status: He is alert and oriented to person, place, and time.   Psychiatric:         Mood and Affect: Mood normal.         Behavior: Behavior normal.         Thought Content: Thought content normal.         Judgment: Judgment normal.

## 2024-02-28 ENCOUNTER — APPOINTMENT (OUTPATIENT)
Dept: PHYSICAL THERAPY | Facility: CLINIC | Age: 24
End: 2024-02-28

## 2024-02-28 PROCEDURE — 97530 THERAPEUTIC ACTIVITIES: CPT | Performed by: PHYSICAL THERAPIST

## 2024-05-28 ENCOUNTER — OFFICE VISIT (OUTPATIENT)
Dept: FAMILY MEDICINE CLINIC | Facility: CLINIC | Age: 24
End: 2024-05-28
Payer: COMMERCIAL

## 2024-05-28 VITALS
TEMPERATURE: 97.3 F | RESPIRATION RATE: 18 BRPM | BODY MASS INDEX: 30.66 KG/M2 | SYSTOLIC BLOOD PRESSURE: 122 MMHG | WEIGHT: 226.4 LBS | HEART RATE: 78 BPM | DIASTOLIC BLOOD PRESSURE: 80 MMHG | HEIGHT: 72 IN

## 2024-05-28 DIAGNOSIS — S39.011A ABDOMINAL MUSCLE STRAIN, INITIAL ENCOUNTER: Primary | ICD-10-CM

## 2024-05-28 PROCEDURE — 99213 OFFICE O/P EST LOW 20 MIN: CPT | Performed by: FAMILY MEDICINE

## 2024-05-28 RX ORDER — CYCLOBENZAPRINE HCL 10 MG
10 TABLET ORAL
Qty: 21 TABLET | Refills: 0 | Status: SHIPPED | OUTPATIENT
Start: 2024-05-28 | End: 2024-06-18

## 2024-05-28 NOTE — PROGRESS NOTES
Assessment/Plan:    1. Abdominal muscle strain, initial encounter  -     cyclobenzaprine (FLEXERIL) 10 mg tablet; Take 1 tablet (10 mg total) by mouth daily at bedtime for 21 days          There are no Patient Instructions on file for this visit.    No follow-ups on file.    Subjective:      Patient ID: Shun Ro is a 24 y.o. male.    Chief Complaint   Patient presents with   • Abdominal Pain     Sxs 7 days   Left side and will sit on upper abdomen   Sore everyday when  pt wakes up throughout the day it gets better        Pt states 8 days ago he was working out, doing crunches, after that he felt cramp in upper left quad, really uncomfortable, then went away and then had a dull pain.  Next two days still present.    If he moves woddly iot will flare up and get uncomfortable.    No blood in stool]  N change in bowel habbits  No nausea no vomiting      Abdominal Pain  This is a new problem. The current episode started in the past 7 days. The onset quality is sudden. The problem occurs 2 to 4 times per day. The most recent episode lasted 2 hours. The problem has been waxing and waning. The pain is located in the LUQ. The pain is at a severity of 2/10. The quality of the pain is cramping and dull. The abdominal pain does not radiate. Pertinent negatives include no anorexia, arthralgias, belching, constipation, diarrhea, dysuria, fever, flatus, frequency, headaches, hematochezia, hematuria, melena, myalgias, nausea, vomiting or weight loss. The pain is aggravated by certain positions and movement. The pain is relieved by Nothing.       The following portions of the patient's history were reviewed and updated as appropriate: allergies, current medications, past family history, past medical history, past social history, past surgical history and problem list.    Review of Systems   Constitutional:  Negative for fever and weight loss.   Gastrointestinal:  Positive for abdominal pain. Negative for anorexia, constipation,  diarrhea, flatus, hematochezia, melena, nausea and vomiting.   Genitourinary:  Negative for dysuria, frequency and hematuria.   Musculoskeletal:  Negative for arthralgias and myalgias.   Neurological:  Negative for headaches.         Current Outpatient Medications   Medication Sig Dispense Refill   • cetirizine (ZyrTEC) 10 mg tablet Take 10 mg by mouth daily as needed for allergies     • cyclobenzaprine (FLEXERIL) 10 mg tablet Take 1 tablet (10 mg total) by mouth daily at bedtime for 21 days 21 tablet 0     No current facility-administered medications for this visit.       Objective:    /80   Pulse 78   Temp (!) 97.3 °F (36.3 °C)   Resp 18   Ht 6' (1.829 m)   Wt 103 kg (226 lb 6.4 oz)   BMI 30.71 kg/m²        Physical Exam  Vitals and nursing note reviewed.   Constitutional:       General: He is not in acute distress.     Appearance: He is well-developed. He is not diaphoretic.   HENT:      Head: Normocephalic and atraumatic.      Right Ear: External ear normal.      Left Ear: External ear normal.      Nose: Nose normal.      Mouth/Throat:      Pharynx: No oropharyngeal exudate.   Eyes:      General: No scleral icterus.        Right eye: No discharge.         Left eye: No discharge.      Pupils: Pupils are equal, round, and reactive to light.   Neck:      Thyroid: No thyromegaly.   Cardiovascular:      Rate and Rhythm: Normal rate.      Heart sounds: Normal heart sounds. No murmur heard.  Pulmonary:      Effort: Pulmonary effort is normal. No respiratory distress.      Breath sounds: Normal breath sounds. No wheezing.   Abdominal:      General: Bowel sounds are normal. There is no distension.      Palpations: Abdomen is soft. There is no mass.      Tenderness: There is abdominal tenderness (deep tenderness in the left lower costal margin). There is no guarding or rebound.   Musculoskeletal:         General: Normal range of motion.   Skin:     General: Skin is warm and dry.      Findings: No erythema or  rash.   Neurological:      Mental Status: He is alert.      Coordination: Coordination normal.      Deep Tendon Reflexes: Reflexes normal.   Psychiatric:         Behavior: Behavior normal.                Frank Lombardi, DO

## 2025-05-03 ENCOUNTER — OFFICE VISIT (OUTPATIENT)
Dept: URGENT CARE | Facility: CLINIC | Age: 25
End: 2025-05-03
Payer: COMMERCIAL

## 2025-05-03 VITALS
BODY MASS INDEX: 29.53 KG/M2 | SYSTOLIC BLOOD PRESSURE: 131 MMHG | HEART RATE: 81 BPM | DIASTOLIC BLOOD PRESSURE: 60 MMHG | HEIGHT: 72 IN | OXYGEN SATURATION: 98 % | RESPIRATION RATE: 18 BRPM | TEMPERATURE: 96.2 F | WEIGHT: 218 LBS

## 2025-05-03 DIAGNOSIS — B34.9 VIRAL ILLNESS: Primary | ICD-10-CM

## 2025-05-03 PROCEDURE — 87636 SARSCOV2 & INF A&B AMP PRB: CPT | Performed by: PHYSICIAN ASSISTANT

## 2025-05-03 PROCEDURE — 99213 OFFICE O/P EST LOW 20 MIN: CPT | Performed by: PHYSICIAN ASSISTANT

## 2025-05-03 RX ORDER — ALBUTEROL SULFATE 90 UG/1
2 INHALANT RESPIRATORY (INHALATION) EVERY 6 HOURS PRN
Qty: 6.7 G | Refills: 0 | Status: SHIPPED | OUTPATIENT
Start: 2025-05-03

## 2025-05-03 RX ORDER — BENZONATATE 100 MG/1
100 CAPSULE ORAL 3 TIMES DAILY PRN
Qty: 20 CAPSULE | Refills: 0 | Status: SHIPPED | OUTPATIENT
Start: 2025-05-03

## 2025-05-03 RX ORDER — FLUTICASONE PROPIONATE 50 MCG
1 SPRAY, SUSPENSION (ML) NASAL DAILY
Qty: 1 G | Refills: 0 | Status: SHIPPED | OUTPATIENT
Start: 2025-05-03

## 2025-05-03 NOTE — PATIENT INSTRUCTIONS
Acute Bronchitis: COVID/Flu PCR sent out  -Will send albuterol in for wheezing to be used as needed.   -Based on the patients hx and clinical presentation they are likely suffering from a Viral illness. No sign of bacterial infection at this time.   -Stay very well hydrated and push fluids, gatorade, pedialyte or electrolyte drinks can be beneficial. Staying hydrated is very important.   -Run a humidifier by your bed and take steamy showers to clear mucus   -Zicam nasal AllClear can be soothing to the nasal passages   -You can use flonase once daily for two weeks   -Advil or Tylenol for fever or pain.  -Diffusing aromatherapy oils, such as peppermint and eucalyptus can be soothing and help with congestion.   -Mucinex OTC or Zyrtec or Claritin. Drink plenty of water with the mucinex.   -Honey or throat lozenges for cough may be helpful  -Warm salt water gargles and tea with honey. Warm soups and teas can be soothing.   -Sleep with a few pillows propping you up at night to aid with coughing and congestion.   -Obtain a pulse ox device and check your O2 1-2 times a day. You want your oxygen levels to be >93%. If they go below 93% go to the ED immediately.   -Vitamin C 500mg, Vitamin D 2000IU daily. You can attempt to use zinc or Zicam up to 30mg per day.  Echinacea and elderberry may also aid with fighting viral infections.   -If your sx worsen or persist follow up with your PCP for recheck. Red flag signs and ED precautions discussed.

## 2025-05-03 NOTE — PROGRESS NOTES
[facility]        NAME: Shun Ro is a 24 y.o. male  : 2000    MRN: [id number]  DATE: May 3, 2025  TIME: 11:18 AM    Assessment and Plan   Viral illness [B34.9]  1. Viral illness  Covid19 and INFLUENZA A/B PCR    albuterol (Proventil HFA) 90 mcg/act inhaler    benzonatate (TESSALON PERLES) 100 mg capsule    fluticasone (FLONASE) 50 mcg/act nasal spray            Patient Instructions   Acute Bronchitis: COVID/Flu PCR sent out  -Will send albuterol in for wheezing to be used as needed.   -Based on the patients hx and clinical presentation they are likely suffering from a Viral illness. No sign of bacterial infection at this time.   -Stay very well hydrated and push fluids, gatorade, pedialyte or electrolyte drinks can be beneficial. Staying hydrated is very important.   -Run a humidifier by your bed and take steamy showers to clear mucus   -Zicam nasal AllClear can be soothing to the nasal passages   -You can use flonase once daily for two weeks   -Advil or Tylenol for fever or pain.  -Diffusing aromatherapy oils, such as peppermint and eucalyptus can be soothing and help with congestion.   -Mucinex OTC or Zyrtec or Claritin. Drink plenty of water with the mucinex.   -Honey or throat lozenges for cough may be helpful  -Warm salt water gargles and tea with honey. Warm soups and teas can be soothing.   -Sleep with a few pillows propping you up at night to aid with coughing and congestion.   -Obtain a pulse ox device and check your O2 1-2 times a day. You want your oxygen levels to be >93%. If they go below 93% go to the ED immediately.   -Vitamin C 500mg, Vitamin D 2000IU daily. You can attempt to use zinc or Zicam up to 30mg per day.  Echinacea and elderberry may also aid with fighting viral infections.   -If your sx worsen or persist follow up with your PCP for recheck. Red flag signs and ED precautions discussed.         Follow up with PCP in 3-5 days.  Proceed to  ER if symptoms worsen.    If  tests have been performed at [facility], our office will contact you with results if changes need to be made to the care plan discussed with you at the visit.  You can review your full results on [facility] MyChart.    Chief Complaint     Chief Complaint   Patient presents with    viral illness     Began with sinus pain, fever 100 Wednesday  chills, body aches         History of Present Illness       Pt is a 24-year-old male with no significant PMH who presents today with complaints of fever, dyspnea on exertion, chills, wheezing, myalgia, fatigue, sinus pain/pressure, nasal/chest congestion, and nonproductive cough since Wednesday 4/30/25. Pt's max temperature was 100 degrees. Last fever was on Wednesday 4/30/25. Pt denies sick contacts, chest pain, palpitations, stridor, sore throat, nausea, vomiting, diarrhea, abdominal pain, headache, otalgia, or PND. Pt endorses lack of appetite but is drinking fluids appropriately. Pt is urinating as normal. No history of asthma. Pt is not a smoker. Pt has tried OTC measures such as Dayquil with only minimal relief of symptoms. He states that he is in  school and gets winded with doing intense physical labor and drills since being ill.             Review of Systems   Review of Systems   Constitutional:  Positive for chills, fatigue and fever. Negative for activity change, appetite change and diaphoresis.   HENT:  Positive for congestion. Negative for ear discharge, ear pain, facial swelling, hearing loss, postnasal drip, rhinorrhea, sinus pressure, sinus pain, sore throat, tinnitus, trouble swallowing and voice change.    Eyes:  Negative for visual disturbance.   Respiratory:  Positive for cough and wheezing. Negative for apnea, chest tightness, shortness of breath and stridor.    Cardiovascular:  Negative for chest pain, palpitations and leg swelling.   Gastrointestinal:  Negative for abdominal distention, abdominal pain, nausea and vomiting.   Genitourinary:   Negative for decreased urine volume.   Musculoskeletal:  Positive for myalgias. Negative for arthralgias, joint swelling, neck pain and neck stiffness.   Skin:  Negative for rash.   Allergic/Immunologic: Negative for immunocompromised state.   Neurological:  Negative for dizziness, weakness, light-headedness, numbness and headaches.   Hematological:  Negative for adenopathy.         Current Medications       Current Outpatient Medications:     albuterol (Proventil HFA) 90 mcg/act inhaler, Inhale 2 puffs every 6 (six) hours as needed for wheezing or shortness of breath, Disp: 6.7 g, Rfl: 0    benzonatate (TESSALON PERLES) 100 mg capsule, Take 1 capsule (100 mg total) by mouth 3 (three) times a day as needed for cough, Disp: 20 capsule, Rfl: 0    fluticasone (FLONASE) 50 mcg/act nasal spray, 1 spray into each nostril daily, Disp: 1 g, Rfl: 0    cetirizine (ZyrTEC) 10 mg tablet, Take 10 mg by mouth daily as needed for allergies (Patient not taking: Reported on 5/3/2025), Disp: , Rfl:     cyclobenzaprine (FLEXERIL) 10 mg tablet, Take 1 tablet (10 mg total) by mouth daily at bedtime for 21 days, Disp: 21 tablet, Rfl: 0    Current Allergies     Allergies as of 05/03/2025    (No Known Allergies)            The following portions of the patient's history were reviewed and updated as appropriate: allergies, current medications, past family history, past medical history, past social history, past surgical history and problem list.     Past Medical History:   Diagnosis Date    Allergic rhinitis     TFCC (triangular fibrocartilage complex) injury, right, initial encounter 3/21/2023       Past Surgical History:   Procedure Laterality Date    CIRCUMCISION      MOUTH SURGERY         Family History   Problem Relation Age of Onset    No Known Problems Mother     No Known Problems Father     No Known Problems Sister     No Known Problems Brother     No Known Problems Maternal Aunt     No Known Problems Maternal Uncle     No Known  Problems Paternal Aunt     No Known Problems Paternal Uncle     No Known Problems Maternal Grandmother     No Known Problems Maternal Grandfather     No Known Problems Paternal Grandmother     No Known Problems Paternal Grandfather          Medications have been verified.        Objective   /60   Pulse 81   Temp (!) 96.2 °F (35.7 °C)   Resp 18   Ht 6' (1.829 m)   Wt 98.9 kg (218 lb)   SpO2 98%   BMI 29.57 kg/m²   No LMP for male patient.       Physical Exam     Physical Exam  Vitals and nursing note reviewed.   Constitutional:       General: He is not in acute distress.     Appearance: He is well-developed. He is not ill-appearing, toxic-appearing or diaphoretic.   HENT:      Head: Normocephalic and atraumatic.      Right Ear: Hearing, tympanic membrane, ear canal and external ear normal.      Left Ear: Hearing, tympanic membrane, ear canal and external ear normal.      Nose: Nose normal. No mucosal edema or rhinorrhea.      Right Sinus: No maxillary sinus tenderness or frontal sinus tenderness.      Left Sinus: No maxillary sinus tenderness or frontal sinus tenderness.      Mouth/Throat:      Pharynx: Uvula midline. No oropharyngeal exudate, posterior oropharyngeal erythema or uvula swelling.      Tonsils: No tonsillar abscesses.   Cardiovascular:      Rate and Rhythm: Normal rate and regular rhythm.      Heart sounds: S1 normal and S2 normal. Heart sounds not distant. No murmur heard.     No friction rub. No gallop. No S3 or S4 sounds.   Pulmonary:      Effort: No tachypnea, bradypnea, accessory muscle usage or respiratory distress.      Breath sounds: Examination of the left-lower field reveals wheezing. Wheezing present. No decreased breath sounds, rhonchi or rales.      Comments: Mild wheezing left lower lungs   Musculoskeletal:      Cervical back: Normal range of motion and neck supple.   Lymphadenopathy:      Cervical: No cervical adenopathy.   Neurological:      Mental Status: He is alert and  oriented to person, place, and time.   Psychiatric:         Behavior: Behavior normal.

## 2025-05-05 LAB
FLUAV RNA RESP QL NAA+PROBE: NEGATIVE
FLUBV RNA RESP QL NAA+PROBE: NEGATIVE
SARS-COV-2 RNA RESP QL NAA+PROBE: NEGATIVE